# Patient Record
Sex: FEMALE | Race: WHITE | Employment: UNEMPLOYED | URBAN - NONMETROPOLITAN AREA
[De-identification: names, ages, dates, MRNs, and addresses within clinical notes are randomized per-mention and may not be internally consistent; named-entity substitution may affect disease eponyms.]

---

## 2017-01-01 ENCOUNTER — NURSE TRIAGE (OUTPATIENT)
Dept: ADMINISTRATIVE | Age: 57
End: 2017-01-01

## 2017-01-02 PROBLEM — R41.82 ALTERED MENTAL STATUS: Status: ACTIVE | Noted: 2017-01-02

## 2017-01-03 PROBLEM — I95.9 HYPOTENSION: Status: ACTIVE | Noted: 2017-01-03

## 2017-01-03 PROBLEM — R09.02 HYPOXIA: Status: ACTIVE | Noted: 2017-01-03

## 2017-01-05 PROBLEM — R09.02 HYPOXIA: Status: ACTIVE | Noted: 2017-01-05

## 2017-01-05 PROBLEM — J18.9 PNEUMONIA: Status: ACTIVE | Noted: 2017-01-05

## 2017-01-11 ENCOUNTER — NURSE TRIAGE (OUTPATIENT)
Dept: ADMINISTRATIVE | Age: 57
End: 2017-01-11

## 2017-01-12 PROBLEM — R58 RETROPERITONEAL BLEED: Status: ACTIVE | Noted: 2017-01-12

## 2017-02-21 ENCOUNTER — INITIAL CONSULT (OUTPATIENT)
Dept: PHYSICAL MEDICINE AND REHAB | Age: 57
End: 2017-02-21

## 2017-02-21 VITALS
HEART RATE: 77 BPM | SYSTOLIC BLOOD PRESSURE: 106 MMHG | HEIGHT: 56 IN | WEIGHT: 110 LBS | BODY MASS INDEX: 24.75 KG/M2 | DIASTOLIC BLOOD PRESSURE: 73 MMHG

## 2017-02-21 DIAGNOSIS — R26.0 ATAXIC GAIT: ICD-10-CM

## 2017-02-21 DIAGNOSIS — I69.30 HISTORY OF STROKE WITH RESIDUAL DEFICIT: Primary | ICD-10-CM

## 2017-02-21 PROCEDURE — 99205 OFFICE O/P NEW HI 60 MIN: CPT | Performed by: PSYCHIATRY & NEUROLOGY

## 2017-02-21 PROCEDURE — 3017F COLORECTAL CA SCREEN DOC REV: CPT | Performed by: PSYCHIATRY & NEUROLOGY

## 2017-02-21 PROCEDURE — G8598 ASA/ANTIPLAT THER USED: HCPCS | Performed by: PSYCHIATRY & NEUROLOGY

## 2017-02-21 PROCEDURE — G8484 FLU IMMUNIZE NO ADMIN: HCPCS | Performed by: PSYCHIATRY & NEUROLOGY

## 2017-02-21 PROCEDURE — G8427 DOCREV CUR MEDS BY ELIG CLIN: HCPCS | Performed by: PSYCHIATRY & NEUROLOGY

## 2017-02-21 PROCEDURE — 4004F PT TOBACCO SCREEN RCVD TLK: CPT | Performed by: PSYCHIATRY & NEUROLOGY

## 2017-02-21 PROCEDURE — 3014F SCREEN MAMMO DOC REV: CPT | Performed by: PSYCHIATRY & NEUROLOGY

## 2017-02-21 PROCEDURE — G8420 CALC BMI NORM PARAMETERS: HCPCS | Performed by: PSYCHIATRY & NEUROLOGY

## 2017-05-03 PROBLEM — S62.102A CLOSED FRACTURE OF LEFT WRIST: Status: ACTIVE | Noted: 2017-05-03

## 2017-05-03 PROBLEM — S82.141A CLOSED FRACTURE OF RIGHT TIBIAL PLATEAU: Status: ACTIVE | Noted: 2017-05-03

## 2019-02-28 ENCOUNTER — HOSPITAL ENCOUNTER (OUTPATIENT)
Dept: CT IMAGING | Age: 59
Discharge: HOME | End: 2019-02-28
Payer: MEDICARE

## 2019-02-28 ENCOUNTER — HOSPITAL ENCOUNTER (OUTPATIENT)
Dept: PET IMAGING | Age: 59
Discharge: HOME | End: 2019-02-28
Payer: MEDICARE

## 2019-02-28 DIAGNOSIS — Z00.6 ENCOUNTER FOR EXAMINATION FOR NORMAL COMPARISON AND CONTROL IN CLINICAL RESEARCH PROGRAM: ICD-10-CM

## 2019-02-28 DIAGNOSIS — R91.1 LUNG NODULE: ICD-10-CM

## 2019-02-28 PROCEDURE — 3430000000 HC RX DIAGNOSTIC RADIOPHARMACEUTICAL: Performed by: INTERNAL MEDICINE

## 2019-02-28 PROCEDURE — 3209999900 CT COMPARISON OF OUTSIDE FILMS

## 2019-02-28 PROCEDURE — A9552 F18 FDG: HCPCS | Performed by: INTERNAL MEDICINE

## 2019-02-28 PROCEDURE — 78815 PET IMAGE W/CT SKULL-THIGH: CPT

## 2019-02-28 RX ORDER — FLUDEOXYGLUCOSE F 18 200 MCI/ML
13.3 INJECTION, SOLUTION INTRAVENOUS
Status: COMPLETED | OUTPATIENT
Start: 2019-02-28 | End: 2019-02-28

## 2019-02-28 RX ADMIN — FLUDEOXYGLUCOSE F 18 13.3 MILLICURIE: 200 INJECTION, SOLUTION INTRAVENOUS at 09:24

## 2019-03-05 ENCOUNTER — HOSPITAL ENCOUNTER (OUTPATIENT)
Dept: GENERAL RADIOLOGY | Age: 59
Discharge: HOME | End: 2019-03-05
Payer: MEDICARE

## 2019-03-05 ENCOUNTER — HOSPITAL ENCOUNTER (OUTPATIENT)
Dept: CT IMAGING | Age: 59
Discharge: HOME | End: 2019-03-05
Payer: MEDICARE

## 2019-03-05 DIAGNOSIS — Z00.6 EXAMINATION FOR NORMAL COMPARISON FOR CLINICAL RESEARCH: ICD-10-CM

## 2019-03-05 PROCEDURE — 3209999900 CT COMPARISON OF OUTSIDE FILMS

## 2019-03-05 PROCEDURE — 3209999900 XR COMPARISON OF OUTSIDE FILMS

## 2019-03-11 ENCOUNTER — CLINICAL DOCUMENTATION (OUTPATIENT)
Dept: CASE MANAGEMENT | Age: 59
End: 2019-03-11

## 2019-03-11 ENCOUNTER — HOSPITAL ENCOUNTER (OUTPATIENT)
Dept: RADIATION ONCOLOGY | Age: 59
Discharge: HOME | End: 2019-03-11
Payer: MEDICARE

## 2019-03-11 PROCEDURE — 99202 OFFICE O/P NEW SF 15 MIN: CPT | Performed by: RADIOLOGY

## 2019-03-12 ENCOUNTER — CLINICAL DOCUMENTATION (OUTPATIENT)
Dept: CASE MANAGEMENT | Age: 59
End: 2019-03-12

## 2019-03-18 ENCOUNTER — CLINICAL DOCUMENTATION (OUTPATIENT)
Dept: CASE MANAGEMENT | Age: 59
End: 2019-03-18

## 2019-03-19 ENCOUNTER — CLINICAL DOCUMENTATION (OUTPATIENT)
Dept: CASE MANAGEMENT | Age: 59
End: 2019-03-19

## 2019-05-29 ENCOUNTER — TELEPHONE (OUTPATIENT)
Dept: SPIRITUAL SERVICES | Facility: CLINIC | Age: 59
End: 2019-05-29

## 2019-07-09 ENCOUNTER — TELEPHONE (OUTPATIENT)
Dept: SPIRITUAL SERVICES | Facility: CLINIC | Age: 59
End: 2019-07-09

## 2019-07-09 NOTE — TELEPHONE ENCOUNTER
Patient was a referral from her [facility] / assessment for patient needs.  followed-up with a 2nd telephone call to schedule an appointment for the completion of documents, if desired. No answer. Left message.  will follow-up to schedule an appointment.

## 2019-09-25 ENCOUNTER — TELEPHONE (OUTPATIENT)
Dept: SPIRITUAL SERVICES | Facility: CLINIC | Age: 59
End: 2019-09-25

## 2019-09-25 NOTE — TELEPHONE ENCOUNTER
Patient was a referral from her [facility] / assessment for patient needs.  followed-up with a 3rd telephone attempt to offer support for her diagnosis and treatment, if desired, as well as to schedule an appointment for the completion of Advance Directives documents. No answer. Left message. A letter was sent with a brochure and business card with contact information.

## 2019-11-09 ENCOUNTER — HOSPITAL ENCOUNTER (EMERGENCY)
Age: 59
Discharge: HOME | End: 2019-11-09
Payer: MEDICARE

## 2019-11-09 ENCOUNTER — APPOINTMENT (OUTPATIENT)
Dept: GENERAL RADIOLOGY | Age: 59
End: 2019-11-09
Payer: MEDICARE

## 2019-11-09 VITALS
RESPIRATION RATE: 18 BRPM | DIASTOLIC BLOOD PRESSURE: 67 MMHG | OXYGEN SATURATION: 96 % | WEIGHT: 117 LBS | SYSTOLIC BLOOD PRESSURE: 105 MMHG | TEMPERATURE: 98.5 F | HEART RATE: 69 BPM | BODY MASS INDEX: 26.23 KG/M2

## 2019-11-09 DIAGNOSIS — S42.291A OTHER CLOSED DISPLACED FRACTURE OF PROXIMAL END OF RIGHT HUMERUS, INITIAL ENCOUNTER: Primary | ICD-10-CM
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
## 2019-11-09 PROCEDURE — 99283 EMERGENCY DEPT VISIT LOW MDM: CPT

## 2019-11-09 PROCEDURE — 73030 X-RAY EXAM OF SHOULDER: CPT

## 2019-11-09 RX ORDER — KETOROLAC TROMETHAMINE 10 MG/1
10 TABLET, FILM COATED ORAL EVERY 6 HOURS PRN
Qty: 12 TABLET | Refills: 0 | Status: SHIPPED | OUTPATIENT
Start: 2019-11-09

## 2019-11-09 ASSESSMENT — ENCOUNTER SYMPTOMS
SHORTNESS OF BREATH: 0
ABDOMINAL PAIN: 0
BACK PAIN: 0
PHOTOPHOBIA: 0
NAUSEA: 0
RHINORRHEA: 0
DIARRHEA: 0
VOMITING: 0

## 2019-11-09 ASSESSMENT — PAIN DESCRIPTION - PAIN TYPE: TYPE: ACUTE PAIN

## 2019-11-09 ASSESSMENT — PAIN SCALES - GENERAL: PAINLEVEL_OUTOF10: 10

## 2019-11-09 ASSESSMENT — PAIN DESCRIPTION - LOCATION: LOCATION: SHOULDER

## 2019-11-09 ASSESSMENT — PAIN DESCRIPTION - ORIENTATION: ORIENTATION: LEFT

## 2020-11-03 PROBLEM — J18.9 PNEUMONIA OF BOTH LOWER LOBES DUE TO INFECTIOUS ORGANISM: Status: RESOLVED | Noted: 2020-11-03 | Resolved: 2020-11-03

## 2021-10-20 ENCOUNTER — HOSPITAL ENCOUNTER (EMERGENCY)
Age: 61
Discharge: HOME | End: 2021-10-20
Attending: EMERGENCY MEDICINE
Payer: MEDICARE

## 2021-10-20 ENCOUNTER — APPOINTMENT (OUTPATIENT)
Dept: CT IMAGING | Age: 61
End: 2021-10-20
Payer: MEDICARE

## 2021-10-20 VITALS
SYSTOLIC BLOOD PRESSURE: 118 MMHG | DIASTOLIC BLOOD PRESSURE: 81 MMHG | OXYGEN SATURATION: 98 % | BODY MASS INDEX: 26.54 KG/M2 | RESPIRATION RATE: 16 BRPM | WEIGHT: 118 LBS | HEIGHT: 56 IN | HEART RATE: 60 BPM | TEMPERATURE: 97.7 F

## 2021-10-20 DIAGNOSIS — T14.8XXA MULTIPLE SKIN TEARS: ICD-10-CM

## 2021-10-20 DIAGNOSIS — S00.83XA TRAUMATIC HEMATOMA OF FOREHEAD, INITIAL ENCOUNTER: Primary | ICD-10-CM
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
## 2021-10-20 LAB
ANION GAP SERPL CALCULATED.3IONS-SCNC: 10 MEQ/L (ref 8–16)
APTT: 29.6 SECONDS (ref 22–38)
BASOPHILS # BLD: 0.2 %
BASOPHILS ABSOLUTE: 0 THOU/MM3 (ref 0–0.1)
BUN BLDV-MCNC: 16 MG/DL (ref 7–22)
CALCIUM SERPL-MCNC: 9.6 MG/DL (ref 8.5–10.5)
CHLORIDE BLD-SCNC: 106 MEQ/L (ref 98–111)
CO2: 26 MEQ/L (ref 23–33)
CREAT SERPL-MCNC: 0.7 MG/DL (ref 0.4–1.2)
EKG ATRIAL RATE: 70 BPM
EKG P AXIS: 65 DEGREES
EKG P-R INTERVAL: 136 MS
EKG Q-T INTERVAL: 354 MS
EKG QRS DURATION: 70 MS
EKG QTC CALCULATION (BAZETT): 382 MS
EKG R AXIS: 51 DEGREES
EKG T AXIS: 59 DEGREES
EKG VENTRICULAR RATE: 70 BPM
EOSINOPHIL # BLD: 0.4 %
EOSINOPHILS ABSOLUTE: 0 THOU/MM3 (ref 0–0.4)
ERYTHROCYTE [DISTWIDTH] IN BLOOD BY AUTOMATED COUNT: 13.3 % (ref 11.5–14.5)
ERYTHROCYTE [DISTWIDTH] IN BLOOD BY AUTOMATED COUNT: 44.5 FL (ref 35–45)
GFR SERPL CREATININE-BSD FRML MDRD: 85 ML/MIN/1.73M2
GLUCOSE BLD-MCNC: 97 MG/DL (ref 70–108)
HCT VFR BLD CALC: 38.8 % (ref 37–47)
HEMOGLOBIN: 12.8 GM/DL (ref 12–16)
IMMATURE GRANS (ABS): 0.04 THOU/MM3 (ref 0–0.07)
IMMATURE GRANULOCYTES: 0.5 %
INR BLD: 0.99 (ref 0.85–1.13)
LYMPHOCYTES # BLD: 13.4 %
LYMPHOCYTES ABSOLUTE: 1.1 THOU/MM3 (ref 1–4.8)
MCH RBC QN AUTO: 30.6 PG (ref 26–33)
MCHC RBC AUTO-ENTMCNC: 33 GM/DL (ref 32.2–35.5)
MCV RBC AUTO: 92.8 FL (ref 81–99)
MONOCYTES # BLD: 8.1 %
MONOCYTES ABSOLUTE: 0.6 THOU/MM3 (ref 0.4–1.3)
NUCLEATED RED BLOOD CELLS: 0 /100 WBC
PLATELET # BLD: 142 THOU/MM3 (ref 130–400)
PMV BLD AUTO: 10 FL (ref 9.4–12.4)
POTASSIUM REFLEX MAGNESIUM: 3.7 MEQ/L (ref 3.5–5.2)
RBC # BLD: 4.18 MILL/MM3 (ref 4.2–5.4)
SEG NEUTROPHILS: 77.4 %
SEGMENTED NEUTROPHILS ABSOLUTE COUNT: 6.2 THOU/MM3 (ref 1.8–7.7)
SODIUM BLD-SCNC: 142 MEQ/L (ref 135–145)
WBC # BLD: 8 THOU/MM3 (ref 4.8–10.8)

## 2021-10-20 PROCEDURE — 6370000000 HC RX 637 (ALT 250 FOR IP): Performed by: STUDENT IN AN ORGANIZED HEALTH CARE EDUCATION/TRAINING PROGRAM

## 2021-10-20 PROCEDURE — 12004 RPR S/N/AX/GEN/TRK7.6-12.5CM: CPT

## 2021-10-20 PROCEDURE — 85025 COMPLETE CBC W/AUTO DIFF WBC: CPT

## 2021-10-20 PROCEDURE — 2500000003 HC RX 250 WO HCPCS: Performed by: STUDENT IN AN ORGANIZED HEALTH CARE EDUCATION/TRAINING PROGRAM

## 2021-10-20 PROCEDURE — 36415 COLL VENOUS BLD VENIPUNCTURE: CPT

## 2021-10-20 PROCEDURE — 99284 EMERGENCY DEPT VISIT MOD MDM: CPT

## 2021-10-20 PROCEDURE — 80048 BASIC METABOLIC PNL TOTAL CA: CPT

## 2021-10-20 PROCEDURE — 85610 PROTHROMBIN TIME: CPT

## 2021-10-20 PROCEDURE — 85730 THROMBOPLASTIN TIME PARTIAL: CPT

## 2021-10-20 PROCEDURE — 70450 CT HEAD/BRAIN W/O DYE: CPT

## 2021-10-20 PROCEDURE — 93005 ELECTROCARDIOGRAM TRACING: CPT | Performed by: STUDENT IN AN ORGANIZED HEALTH CARE EDUCATION/TRAINING PROGRAM

## 2021-10-20 PROCEDURE — 93010 ELECTROCARDIOGRAM REPORT: CPT | Performed by: NUCLEAR MEDICINE

## 2021-10-20 RX ORDER — ACETAMINOPHEN 500 MG
1000 TABLET ORAL ONCE
Status: COMPLETED | OUTPATIENT
Start: 2021-10-20 | End: 2021-10-20

## 2021-10-20 RX ORDER — LIDOCAINE HYDROCHLORIDE 10 MG/ML
20 INJECTION, SOLUTION EPIDURAL; INFILTRATION; INTRACAUDAL; PERINEURAL ONCE
Status: COMPLETED | OUTPATIENT
Start: 2021-10-20 | End: 2021-10-20

## 2021-10-20 RX ADMIN — LIDOCAINE HYDROCHLORIDE 20 ML: 10 INJECTION, SOLUTION EPIDURAL; INFILTRATION; INTRACAUDAL at 16:51

## 2021-10-20 RX ADMIN — ACETAMINOPHEN 1000 MG: 500 TABLET ORAL at 13:57

## 2021-10-20 ASSESSMENT — PAIN DESCRIPTION - LOCATION: LOCATION: FACE

## 2021-10-20 ASSESSMENT — ENCOUNTER SYMPTOMS
CHOKING: 0
BACK PAIN: 0
CONSTIPATION: 0
NAUSEA: 0
ABDOMINAL PAIN: 0
DIARRHEA: 0
COLOR CHANGE: 1
SHORTNESS OF BREATH: 0
VOMITING: 0

## 2021-10-20 ASSESSMENT — PAIN DESCRIPTION - ORIENTATION: ORIENTATION: LEFT

## 2021-10-20 ASSESSMENT — PAIN SCALES - GENERAL
PAINLEVEL_OUTOF10: 8
PAINLEVEL_OUTOF10: 8

## 2021-10-20 ASSESSMENT — PAIN DESCRIPTION - PAIN TYPE: TYPE: ACUTE PAIN

## 2021-10-20 NOTE — ED TRIAGE NOTES
Presents to ED with c/o fall getting out of her car and hitting her head. Hematoma to left forehead. Alert and oriented. Respirations easy and unlabored. Patient unsure why she fell.

## 2021-10-20 NOTE — ED NOTES
Patient resting in bed. Respirations easy and unlabored. No distress noted. Call light within reach.        Neil Carlisle RN  10/20/21 8627

## 2021-10-20 NOTE — ED PROVIDER NOTES
[facility] ENCOUNTER          Pt Name: Casi Soto  MRN: [id number]  [place] 1960  Date of evaluation: 10/20/2021  Treating Resident Physician: Philip Vila MD  Supervising Physician: Anibal Hodges, [facility]       Chief Complaint   Patient presents with    Fall    Head Injury     History obtained from chart review and the patient. HISTORY OF PRESENT ILLNESS    Naina Pryor is a 57 y.o. female who presents to the emergency department for evaluation of fall. At approximately 1130 today, Naina fell on concrete striking her head while getting out of a car. She hit her left forehead and is complaining of 8/10 sharp pain to the area. She also complains of scrapes on her hands and left elbow. Fall was unwitnessed. She did not have any dizziness, lightheadedness, changes in vision prior to the fall. Patient is still on oxygen 2 L/min at night following a Covid infection a few months ago. Medical history significant for:  Osteogenesis imperfecta  Stroke in 2016 with no residual deficits  Lung cancer completed radiation last year          The patient has no other acute complaints at this time. REVIEW OF SYSTEMS   Review of Systems   Constitutional: Negative for activity change, chills, fatigue and fever. Eyes: Negative for visual disturbance. Respiratory: Negative for choking and shortness of breath. Cardiovascular: Negative for chest pain, palpitations and leg swelling. Gastrointestinal: Negative for abdominal pain, constipation, diarrhea, nausea and vomiting. Genitourinary: Negative for decreased urine volume and dysuria. Musculoskeletal: Negative for back pain and neck pain. Skin: Positive for color change and wound. Neurological: Positive for headaches. Negative for dizziness, tremors, seizures, syncope, facial asymmetry, speech difficulty, weakness, light-headedness and numbness.    Psychiatric/Behavioral: 0    pravastatin (PRAVACHOL) 40 MG tablet, Take 1 tablet by mouth nightly, Disp: 30 tablet, Rfl: 0    metoprolol (LOPRESSOR) 25 MG tablet, Take 0.5 tablets by mouth 2 times daily, Disp: 60 tablet, Rfl: 0    fluticasone (FLONASE) 50 MCG/ACT nasal spray, 1 spray by Nasal route daily, Disp: 1 Bottle, Rfl: 0    Multiple Vitamin (MULTIVITAMIN) tablet, Take 1 tablet by mouth daily, Disp: 30 tablet, Rfl: 0    vitamin D (CHOLECALCIFEROL) 1000 UNIT TABS tablet, Take 1 tablet by mouth daily, Disp: 30 tablet, Rfl: 0      SOCIAL HISTORY     Social History     Social History Narrative    Not on file     Social History     Tobacco Use    Smoking status: Current Every Day Smoker     Packs/day: 1.00     Years: 15.00     Pack years: 15.00     Types: Cigarettes    Smokeless tobacco: Never Used   Substance Use Topics    Alcohol use: No     Alcohol/week: 0.0 standard drinks     Comment: 2 glasses rum/day    Drug use: No         ALLERGIES     Allergies   Allergen Reactions    Melatonin Other (See Comments)     High blood pressure, anxiety, insomnia, flushing, sweating per sister    Zanaflex [Tizanidine]      Sister states drops blood pressure and pt has been unresponsive when taking previously         FAMILY HISTORY     Family History   Problem Relation Age of Onset    Arthritis Mother     Mental Illness Mother     Vision Loss Mother     Cancer Father          PREVIOUS RECORDS   Previous records reviewed: Last discharge summary reviewed. PHYSICAL EXAM     ED Triage Vitals   BP Temp Temp src Pulse Resp SpO2 Height Weight   -- -- -- -- -- -- -- --     Initial vital signs and nursing assessment reviewed and normal. Body mass index is 26.46 kg/m². Pulsoximetry is normal per my interpretation. Additional Vital Signs:  Vitals:    10/20/21 1607   BP:    Pulse: 60   Resp:    Temp:    SpO2:        Physical Exam  Vitals and nursing note reviewed. Constitutional:       General: She is not in acute distress. Appearance: Normal appearance. She is not ill-appearing, toxic-appearing or diaphoretic. HENT:      Head:        Right Ear: Tympanic membrane, ear canal and external ear normal.      Left Ear: Tympanic membrane, ear canal and external ear normal.      Mouth/Throat:      Mouth: Mucous membranes are dry. Pharynx: Oropharynx is clear. No oropharyngeal exudate or posterior oropharyngeal erythema. Eyes:      General: No scleral icterus. Right eye: No discharge. Left eye: No discharge. Conjunctiva/sclera: Conjunctivae normal.      Pupils: Pupils are equal, round, and reactive to light. Cardiovascular:      Rate and Rhythm: Normal rate and regular rhythm. Pulses: Normal pulses. Heart sounds: Normal heart sounds. Pulmonary:      Effort: Pulmonary effort is normal.      Breath sounds: Normal breath sounds. Abdominal:      General: Abdomen is flat. Palpations: Abdomen is soft. Tenderness: There is no abdominal tenderness. Musculoskeletal:      Left elbow: Laceration present. Right hand: Laceration present. Left hand: Laceration present. Cervical back: Normal range of motion. No deformity, rigidity or tenderness. Thoracic back: No deformity, tenderness or bony tenderness. Lumbar back: No deformity, tenderness or bony tenderness. Skin:     General: Skin is warm. Capillary Refill: Capillary refill takes less than 2 seconds. Neurological:      Mental Status: She is alert and oriented to person, place, and time. Psychiatric:         Mood and Affect: Mood normal.         Behavior: Behavior normal.               MEDICAL DECISION MAKING   Initial Differential Diagnosis:   1. Fall  2. Hematoma to left forehead  3. Multiple skin tears  4.  Concern for intracranial bleed given size of hematoma, patient's age and aspirin usage    Plan:    ECG   CBC, BMP, coags   Head CT without contrast   Lac Repair   Acetaminophen for analgesia    Summary:  No acute intracranial finding, other work-up unremarkable. Skin tears to third and fourth left digits repaired with interrupted sutures using digital blocks for anesthesia. Third fourth and fifth left fingers splinted to prevent sutures from carrying out while healing. Additional skin tears dressed with clear occlusive dressing. Patient discharged home and advised to follow-up with her primary care provider in 7 to 10 days time for removal of sutures, and to keep splint in place for the next few days. Reviewed return precautions with patient and her daughter including changes in vision, nausea vomiting, increasing headache, redness erythema and warmth, purulent discharge to one of her multiple skin tears. ED RESULTS   Laboratory results:  Labs Reviewed   CBC WITH AUTO DIFFERENTIAL - Abnormal; Notable for the following components:       Result Value    RBC 4.18 (*)     All other components within normal limits   GLOMERULAR FILTRATION RATE, ESTIMATED - Abnormal; Notable for the following components:    Est, Glom Filt Rate 85 (*)     All other components within normal limits   BASIC METABOLIC PANEL W/ REFLEX TO MG FOR LOW K   PROTIME-INR   APTT   ANION GAP       Radiologic studies results:  CT Head WO Contrast   Final Result   1. No acute intracranial pathology   2. Large left frontal scalp hematoma   3. Chronic sinus disease            **This report has been created using voice recognition software. It may contain minor errors which are inherent in voice recognition technology. **      Final report electronically signed by Dr. Abbie Hernandez on 10/20/2021 2:05 PM          ED Medications administered this visit:   Medications   acetaminophen (TYLENOL) tablet 1,000 mg (1,000 mg Oral Given 10/20/21 2855)   lidocaine PF 1 % injection 20 mL (20 mLs IntraDERmal Given by Other 10/20/21 8175)         ED COURSE        Lac Repair    Date/Time: 10/20/2021 4:56 PM  Performed by: [facility], MD  Authorized by: Olimpia Melendrez DO     Consent:     Consent obtained:  Verbal    Consent given by:  Patient    Risks discussed:  Infection, pain, retained foreign body, poor cosmetic result, need for additional repair and poor wound healing    Alternatives discussed:  No treatment and delayed treatment  Anesthesia (see MAR for exact dosages): Anesthesia method:  Nerve block    Block needle gauge:  25 G    Block anesthetic:  Lidocaine 1% w/o epi    Block technique:  Digital block    Block injection procedure:  Anatomic landmarks identified, introduced needle and incremental injection    Block outcome:  Anesthesia achieved  Laceration details:     Location:  Finger    Finger location:  L long finger    Length (cm):  4    Depth (mm):  2  Repair type:     Repair type:  Simple  Pre-procedure details:     Preparation:  Patient was prepped and draped in usual sterile fashion  Exploration:     Wound exploration: wound explored through full range of motion      Wound extent: muscle damage    Treatment:     Area cleansed with:  Margarette    Amount of cleaning:  Standard    Irrigation solution:  Sterile saline    Irrigation volume:  30    Irrigation method:  Syringe    Visualized foreign bodies/material removed: no    Skin repair:     Repair method:  Sutures    Suture size:  6-0    Suture material:  Plain gut    Suture technique:  Simple interrupted    Number of sutures:  4  Approximation:     Approximation:  Close  Post-procedure details:     Dressing:  Non-adherent dressing, bulky dressing and splint for protection    Patient tolerance of procedure:   Tolerated well, no immediate complications  Lac Repair    Date/Time: 10/20/2021 5:01 PM  Performed by: Una Thomas MD  Authorized by: Olimpia Melendrez DO     Consent:     Consent obtained:  Verbal    Consent given by:  Patient    Risks discussed:  Infection, pain, retained foreign body, need for additional repair and poor cosmetic result    Alternatives discussed:  No treatment and delayed treatment  Anesthesia (see MAR for exact dosages): Anesthesia method:  Nerve block    Block location:  Left ring digit block    Block needle gauge:  25 G    Block anesthetic:  Lidocaine 1% w/o epi    Block technique:  Digit block    Block injection procedure:  Anatomic landmarks identified, introduced needle, incremental injection, anatomic landmarks palpated and negative aspiration for blood    Block outcome:  Anesthesia achieved  Laceration details:     Location:  Finger    Finger location:  L ring finger    Length (cm):  4    Depth (mm):  2  Repair type:     Repair type:  Simple  Pre-procedure details:     Preparation:  Patient was prepped and draped in usual sterile fashion  Exploration:     Wound exploration: wound explored through full range of motion      Wound extent: muscle damage    Treatment:     Area cleansed with:  Fabien-Breanna    Amount of cleaning:  Standard    Irrigation solution:  Sterile saline    Irrigation volume:  30    Irrigation method:  Syringe  Skin repair:     Repair method:  Sutures    Suture size:  6-0    Suture material:  Plain gut    Suture technique:  Simple interrupted    Number of sutures:  3  Approximation:     Approximation:  Close  Post-procedure details:     Dressing:  Non-adherent dressing, splint for protection and bulky dressing  Lac Repair    Date/Time: 10/20/2021 5:03 PM  Performed by: Jada De Paz MD  Authorized by: Cezar Urrutia DO         Strict return precautions and follow up instructions were discussed with the patient prior to discharge, with which the patient agrees. MEDICATION CHANGES     Discharge Medication List as of 10/20/2021  4:43 PM            FINAL DISPOSITION     Final diagnoses:   Traumatic hematoma of forehead, initial encounter   Multiple skin tears   Fall, initial encounter     Condition: condition: fair  Dispo: Discharge to home      This transcription was electronically signed.  Parts of this transcriptions may have been dictated by use of voice recognition software and electronically transcribed, and parts may have been transcribed with the assistance of an ED scribe. The transcription may contain errors not detected in proofreading. Please refer to my supervising physician's documentation if my documentation differs.     Electronically Signed: Sandra Ritchie MD, 10/20/21, 11:01 PM         Lisa Jiang MD  Resident  10/20/21 9613

## 2021-10-20 NOTE — ED NOTES
ED nurse-to-nurse bedside report    Chief Complaint   Patient presents with    Fall    Head Injury      LOC: alert and orientated to name, place, date  Vital signs   Vitals:    10/20/21 1315 10/20/21 1320   BP: 118/81    Pulse:  77   Resp:  16   Temp: 97.7 °F (36.5 °C)    TempSrc: Oral    SpO2:  98%   Weight:  118 lb (53.5 kg)   Height:  4' 8\" (1.422 m)      Pain:    Pain Interventions: tylenol  Pain Goal: 0  Oxygen: No    Current needs required none   Telemetry: No  LDAs:   Peripheral IV 10/20/21 Right Antecubital (Active)   Site Assessment Clean;Dry; Intact 10/20/21 1441   Dressing Status Clean;Dry; Intact 10/20/21 1441     Continuous Infusions:   Mobility: Requires assistance * 1  Shepard Fall Risk Score: No flowsheet data found.   Report given to: Oksana Velásquez RN  10/20/21 7644

## 2021-12-27 ENCOUNTER — APPOINTMENT (OUTPATIENT)
Dept: GENERAL RADIOLOGY | Age: 61
End: 2021-12-27
Payer: MEDICARE

## 2021-12-27 ENCOUNTER — HOSPITAL ENCOUNTER (EMERGENCY)
Age: 61
Discharge: HOME | End: 2021-12-27
Payer: MEDICARE

## 2021-12-27 VITALS
TEMPERATURE: 98.9 F | OXYGEN SATURATION: 94 % | DIASTOLIC BLOOD PRESSURE: 51 MMHG | RESPIRATION RATE: 18 BRPM | HEART RATE: 86 BPM | BODY MASS INDEX: 25.42 KG/M2 | SYSTOLIC BLOOD PRESSURE: 94 MMHG | WEIGHT: 113 LBS | HEIGHT: 56 IN

## 2021-12-27 DIAGNOSIS — J18.9 PNEUMONIA OF BOTH LUNGS DUE TO INFECTIOUS ORGANISM, UNSPECIFIED PART OF LUNG: ICD-10-CM

## 2021-12-27 DIAGNOSIS — U07.1 COVID-19: Primary | ICD-10-CM

## 2021-12-27 LAB
ALBUMIN SERPL-MCNC: 3.8 G/DL (ref 3.5–5.1)
ALP BLD-CCNC: 78 U/L (ref 38–126)
ALT SERPL-CCNC: 11 U/L (ref 11–66)
ANION GAP SERPL CALCULATED.3IONS-SCNC: 10 MEQ/L (ref 8–16)
AST SERPL-CCNC: 12 U/L (ref 5–40)
BASOPHILS # BLD: 0.2 %
BASOPHILS ABSOLUTE: 0 THOU/MM3 (ref 0–0.1)
BILIRUB SERPL-MCNC: 0.2 MG/DL (ref 0.3–1.2)
BUN BLDV-MCNC: 17 MG/DL (ref 7–22)
C-REACTIVE PROTEIN: 3.15 MG/DL (ref 0–1)
CALCIUM SERPL-MCNC: 9.3 MG/DL (ref 8.5–10.5)
CHLORIDE BLD-SCNC: 102 MEQ/L (ref 98–111)
CO2: 25 MEQ/L (ref 23–33)
CREAT SERPL-MCNC: 0.8 MG/DL (ref 0.4–1.2)
EKG ATRIAL RATE: 72 BPM
EKG P AXIS: 35 DEGREES
EKG P-R INTERVAL: 138 MS
EKG Q-T INTERVAL: 348 MS
EKG QRS DURATION: 74 MS
EKG QTC CALCULATION (BAZETT): 381 MS
EKG R AXIS: 48 DEGREES
EKG T AXIS: 57 DEGREES
EKG VENTRICULAR RATE: 72 BPM
EOSINOPHIL # BLD: 0.1 %
EOSINOPHILS ABSOLUTE: 0 THOU/MM3 (ref 0–0.4)
ERYTHROCYTE [DISTWIDTH] IN BLOOD BY AUTOMATED COUNT: 13.2 % (ref 11.5–14.5)
ERYTHROCYTE [DISTWIDTH] IN BLOOD BY AUTOMATED COUNT: 44.9 FL (ref 35–45)
FLU A ANTIGEN: NEGATIVE
FLU B ANTIGEN: NEGATIVE
GFR SERPL CREATININE-BSD FRML MDRD: 73 ML/MIN/1.73M2
GLUCOSE BLD-MCNC: 109 MG/DL (ref 70–108)
HCT VFR BLD CALC: 35.3 % (ref 37–47)
HEMOGLOBIN: 11.3 GM/DL (ref 12–16)
IMMATURE GRANS (ABS): 0.03 THOU/MM3 (ref 0–0.07)
IMMATURE GRANULOCYTES: 0.3 %
LYMPHOCYTES # BLD: 11.1 %
LYMPHOCYTES ABSOLUTE: 1.1 THOU/MM3 (ref 1–4.8)
MCH RBC QN AUTO: 30 PG (ref 26–33)
MCHC RBC AUTO-ENTMCNC: 32 GM/DL (ref 32.2–35.5)
MCV RBC AUTO: 93.6 FL (ref 81–99)
MONOCYTES # BLD: 7.7 %
MONOCYTES ABSOLUTE: 0.7 THOU/MM3 (ref 0.4–1.3)
NUCLEATED RED BLOOD CELLS: 0 /100 WBC
OSMOLALITY CALCULATION: 275.9 MOSMOL/KG (ref 275–300)
PLATELET # BLD: 113 THOU/MM3 (ref 130–400)
PMV BLD AUTO: 9.8 FL (ref 9.4–12.4)
POTASSIUM REFLEX MAGNESIUM: 4.1 MEQ/L (ref 3.5–5.2)
PRO-BNP: 836.3 PG/ML (ref 0–900)
PROCALCITONIN: 0.1 NG/ML (ref 0.01–0.09)
RBC # BLD: 3.77 MILL/MM3 (ref 4.2–5.4)
SARS-COV-2, NAAT: DETECTED
SEG NEUTROPHILS: 80.6 %
SEGMENTED NEUTROPHILS ABSOLUTE COUNT: 7.7 THOU/MM3 (ref 1.8–7.7)
SODIUM BLD-SCNC: 137 MEQ/L (ref 135–145)
TOTAL PROTEIN: 6.5 G/DL (ref 6.1–8)
TROPONIN T: < 0.01 NG/ML
WBC # BLD: 9.5 THOU/MM3 (ref 4.8–10.8)

## 2021-12-27 PROCEDURE — 86140 C-REACTIVE PROTEIN: CPT

## 2021-12-27 PROCEDURE — 80053 COMPREHEN METABOLIC PANEL: CPT

## 2021-12-27 PROCEDURE — 93005 ELECTROCARDIOGRAM TRACING: CPT | Performed by: PHYSICIAN ASSISTANT

## 2021-12-27 PROCEDURE — 85025 COMPLETE CBC W/AUTO DIFF WBC: CPT

## 2021-12-27 PROCEDURE — 84145 PROCALCITONIN (PCT): CPT

## 2021-12-27 PROCEDURE — 84484 ASSAY OF TROPONIN QUANT: CPT

## 2021-12-27 PROCEDURE — 83880 ASSAY OF NATRIURETIC PEPTIDE: CPT

## 2021-12-27 PROCEDURE — 36415 COLL VENOUS BLD VENIPUNCTURE: CPT

## 2021-12-27 PROCEDURE — 99283 EMERGENCY DEPT VISIT LOW MDM: CPT

## 2021-12-27 PROCEDURE — 87635 SARS-COV-2 COVID-19 AMP PRB: CPT

## 2021-12-27 PROCEDURE — 71045 X-RAY EXAM CHEST 1 VIEW: CPT

## 2021-12-27 PROCEDURE — 87804 INFLUENZA ASSAY W/OPTIC: CPT

## 2021-12-27 RX ORDER — DOXYCYCLINE HYCLATE 100 MG
100 TABLET ORAL 2 TIMES DAILY
Qty: 20 TABLET | Refills: 0 | Status: SHIPPED | OUTPATIENT
Start: 2021-12-27 | End: 2021-12-27 | Stop reason: ALTCHOICE

## 2021-12-27 RX ORDER — AZITHROMYCIN 250 MG/1
TABLET, FILM COATED ORAL
Qty: 6 TABLET | Refills: 0 | Status: SHIPPED | OUTPATIENT
Start: 2021-12-27

## 2021-12-27 RX ORDER — MULTIVIT-MIN/IRON/FOLIC ACID/K 18-600-40
500 CAPSULE ORAL 2 TIMES DAILY
Qty: 28 CAPSULE | Refills: 0 | Status: SHIPPED | OUTPATIENT
Start: 2021-12-27

## 2021-12-27 RX ORDER — GUAIFENESIN 100 MG/5ML
200 SOLUTION ORAL EVERY 4 HOURS PRN
Qty: 400 ML | Refills: 0 | Status: SHIPPED | OUTPATIENT
Start: 2021-12-27

## 2021-12-27 ASSESSMENT — ENCOUNTER SYMPTOMS
VOMITING: 0
ABDOMINAL PAIN: 0
COUGH: 1
SORE THROAT: 1
RHINORRHEA: 1
SHORTNESS OF BREATH: 1

## 2021-12-27 NOTE — ED NOTES
Pt ambulatory around nurse's station; 92-95% on RA. Pt tolerated well.       Joey Burrows RN  12/27/21 4238

## 2021-12-27 NOTE — ED PROVIDER NOTES
[facility]  eMERGENCY dEPARTMENT eNCOUnter          [address] [facility]       Chief Complaint   Patient presents with    Cough    Shortness of Breath       Nurses Notes reviewed and I agree except as noted inthe HPI. HISTORY OF PRESENT ILLNESS    Lynn Vale is a 57 y.o. female who presents to the Emergency Department for the evaluation of fever, cough, mild shortness of breath. Patient states her symptoms have been ongoing for the past 2 days with the mild shortness of breath occurring this morning, not currently. She notes symptoms including rhinorrhea, nasal congestion, sore throat, headache and productive cough though she has not been able to visualize the expectorated sputum. She reports fever of 102.3 °F this morning. Denies generalized body aches, chest pain, chest tightness, wheezing, vomiting, diarrhea or lower extremity pain/edema. She quit smoking 2 years ago when she was diagnosed with lung cancer. States she underwent radiation and is currently in remission. She has not tried anything for treatment of her current symptoms. She has not received Covid or influenza vaccines. The HPI was provided by the patient. REVIEW OF SYSTEMS     Review of Systems   Constitutional: Positive for chills and fever. HENT: Positive for congestion, rhinorrhea and sore throat. Respiratory: Positive for cough and shortness of breath. Cardiovascular: Negative for chest pain and leg swelling. Gastrointestinal: Negative for abdominal pain and vomiting. Musculoskeletal: Negative for myalgias. Neurological: Positive for headaches. All other systems reviewed and are negative.       PAST MEDICAL HISTORY    has a past medical history of Arthritis, Cerebral artery occlusion with cerebral infarction [facility]), Cerebral venous thrombosis of sigmoid sinus, Hypertension, Movement disorder, Osteogenesis imperfecta, Other disorders of kidney and ureter in diseases classified elsewhere, and Pneumonia. SURGICAL HISTORY      has a past surgical history that includes  section; joint replacement; Colonoscopy; and Wrist fracture surgery (Left, 2017). CURRENT MEDICATIONS       Discharge Medication List as of 2021  2:05 PM      CONTINUE these medications which have NOT CHANGED    Details   Calcium Carbonate (CALCIUM 500 PO) Take by mouthHistorical Med      HYDROcodone-acetaminophen (NORCO) 5-325 MG per tablet Take 1-2 tabs every 4-6 hours as needed for pain., Disp-120 tablet, R-0Normal      meloxicam (MOBIC) 15 MG tablet Take 1 tablet by mouth daily Hold until seen by surgeon as outpatient, Disp-1 tablet, R-0NO PRINT      traZODone (DESYREL) 50 MG tablet Take 1 tablet by mouth nightly as needed for Sleep, Disp-14 tablet, R-0Normal      aspirin 81 MG tablet Take 81 mg by mouth dailyHistorical Med      pantoprazole (PROTONIX) 20 MG tablet Take 20 mg by mouth dailyHistorical Med      pregabalin (LYRICA) 100 MG capsule Take 150 mg by mouth 3 times daily Historical Med      pravastatin (PRAVACHOL) 40 MG tablet Take 1 tablet by mouth nightly, Disp-30 tablet, R-0      metoprolol (LOPRESSOR) 25 MG tablet Take 0.5 tablets by mouth 2 times daily, Disp-60 tablet, R-0      Multiple Vitamin (MULTIVITAMIN) tablet Take 1 tablet by mouth daily, Disp-30 tablet, R-0      vitamin D (CHOLECALCIFEROL) 1000 UNIT TABS tablet Take 1 tablet by mouth daily, Disp-30 tablet, R-0      ketorolac (TORADOL) 10 MG tablet Take 1 tablet by mouth every 6 hours as needed for Pain, Disp-12 tablet, R-0Print      acetaminophen (TYLENOL) 325 MG tablet Take 2 tablets by mouth every 4 hours as needed for Pain Maximum dose of acetaminophen is 4000 mg from all sources in 24 hours. OTC      senna (SENOKOT) 8.6 MG tablet Take 2 tablets by mouth dailyHistorical Med      lactobacillus (CULTURELLE) capsule Take 1 capsule by mouth daily (with breakfast), Disp-30 capsule, R-0      fluticasone (FLONASE) 50 MCG/ACT nasal spray 1 spray by Nasal route daily, Disp-1 Bottle, R-0             ALLERGIES     is allergic to melatonin and zanaflex [tizanidine]. FAMILY HISTORY     She indicated that her mother is alive. She indicated that her father is . family history includes Arthritis in her mother; Cancer in her father; Mental Illness in her mother; Vision Loss in her mother. SOCIAL HISTORY      reports that she has quit smoking. Her smoking use included cigarettes. She has a 15.00 pack-year smoking history. She has never used smokeless tobacco. She reports that she does not drink alcohol and does not use drugs. PHYSICAL EXAM     INITIAL VITALS:  height is 4' 8\" (1.422 m) and weight is 113 lb (51.3 kg). Her oral temperature is 98.9 °F (37.2 °C). Her blood pressure is 94/51 (abnormal) and her pulse is 86. Her respiration is 18 and oxygen saturation is 94%. Physical Exam  Vitals and nursing note reviewed. Constitutional:       Appearance: She is well-developed. HENT:      Head: Normocephalic and atraumatic. Cardiovascular:      Rate and Rhythm: Normal rate and regular rhythm. Heart sounds: Normal heart sounds. No murmur heard. Pulmonary:      Effort: Pulmonary effort is normal. No accessory muscle usage or respiratory distress. Breath sounds: Normal breath sounds. No decreased breath sounds, wheezing or rhonchi. Abdominal:      Palpations: Abdomen is soft. Tenderness: There is no abdominal tenderness. Musculoskeletal:         General: Normal range of motion. Cervical back: Normal range of motion. Right lower leg: No tenderness. No edema. Left lower leg: No tenderness. No edema. Skin:     General: Skin is warm and dry. Neurological:      General: No focal deficit present. Mental Status: She is alert.    Psychiatric:         Mood and Affect: Mood normal.         DIFFERENTIAL DIAGNOSIS:   Differential diagnoses are discussed    DIAGNOSTIC RESULTS     EKG: All EKG's are interpreted by the Emergency Department Physician who either signs or Co-signsthis chart in the absence of a cardiologist.    Lisette Stanley. Rate: 72 bpm  PRinterval: 138 ms  QRS duration: 74 ms  QTc: 381 ms  P-R-T axes: 35, 48, 57  NSR. No STEMI. Compared to old EKG on 10-20-21      RADIOLOGY: non-plain film images(s) such as CT, Ultrasound and MRI are read by the radiologist.    XR CHEST PORTABLE   Final Result      Right upper and bilateral lower lung atelectasis/infiltrate. **This report has been created using voice recognition software. It may contain minor errors which are inherent in voice recognition technology. **      Final report electronically signed by Dr. Mao Morrison on 12/27/2021 1:34 PM          LABS:      Labs Reviewed   COVID-19, RAPID - Abnormal; Notable for the following components:       Result Value    SARS-CoV-2, NAAT DETECTED (*)     All other components within normal limits   CBC WITH AUTO DIFFERENTIAL - Abnormal; Notable for the following components:    RBC 3.77 (*)     Hemoglobin 11.3 (*)     Hematocrit 35.3 (*)     MCHC 32.0 (*)     Platelets 728 (*)     All other components within normal limits   COMPREHENSIVE METABOLIC PANEL W/ REFLEX TO MG FOR LOW K - Abnormal; Notable for the following components:    Glucose 109 (*)     Total Bilirubin 0.2 (*)     All other components within normal limits   GLOMERULAR FILTRATION RATE, ESTIMATED - Abnormal; Notable for the following components:    Est, Glom Filt Rate 73 (*)     All other components within normal limits   C-REACTIVE PROTEIN - Abnormal; Notable for the following components:    CRP 3.15 (*)     All other components within normal limits   PROCALCITONIN - Abnormal; Notable for the following components:    Procalcitonin 0.10 (*)     All other components within normal limits   RAPID INFLUENZA A/B ANTIGENS   TROPONIN   BRAIN NATRIURETIC PEPTIDE   ANION GAP   OSMOLALITY       EMERGENCY DEPARTMENT COURSE:   Vitals:    Vitals:    12/27/21 0949 12/27/21 1052 12/27/21 1207   BP: 110/60 (!) 91/56 (!) 94/51   Pulse: 86     Resp: 18     Temp: 98.9 °F (37.2 °C)     TempSrc: Oral     SpO2: 99% 92% 94%   Weight: 113 lb (51.3 kg)     Height: 4' 8\" (1.422 m)        12:09 PM EST: The patient was seen and evaluated. Patient presents with reassuring vital signs for complaint of fever, cough and mild shortness of breath that began 2 days ago. No current shortness of breath and no complaints of shortness of breath in the ED. She presents with oxygen saturation 99% on room air and maintains ambulatory oxygen at 92 to 95% on room air. Labs revealed Mildly elevated CRP at 3.15 with procalcitonin 0.1 and otherwise reassuring other than being positive for Covid today. She did not have a negative Covid test between her infection a couple months ago and today but did have returned to baseline and given her chest x-ray showing right upper and bilateral lower lung atelectasis/infiltrate, suspect she has recurrence of Covid/new strain. At this time she appears stable for discharge home. Given history of natural infection, it is not likely that monoclonal antibodies will provide any additional benefit. We will give vitamins for immune support as well as doxycycline and guaifenesin. She has home pulse oximeter available and feels comfortable with discharge home at this time. Return precautions were discussed with patient who denied further needs upon discharge. Family agreeable with above plan as well. CRITICAL CARE:   None    CONSULTS:  None    PROCEDURES:  None    FINAL IMPRESSION      1. COVID-19    2.  Pneumonia of both lungs due to infectious organism, unspecified part of lung          DISPOSITION/PLAN   Discharge    PATIENT REFERRED TO:  Florene [address] [place] [address] [phone]    Call   As needed    [address] [facility] [place] [address] [address] EMERGENCY DEPT  [address]  [address] [place] [address]  [phone]    If symptoms worsen      DISCHARGEMEDICATIONS:  Discharge Medication List as of 12/27/2021  2:05 PM      START taking these medications    Details   Ascorbic Acid (VITAMIN C) 500 MG CAPS Take 500 mg by mouth 2 times daily, Disp-28 capsule, R-0Normal      zinc 50 MG CAPS Take 100 mg by mouth nightly, Disp-28 capsule, R-0Normal      guaiFENesin (ROBITUSSIN) 100 MG/5ML SOLN oral solution Take 10 mLs by mouth every 4 hours as needed for Cough, Disp-400 mL, R-0Normal      doxycycline hyclate (VIBRA-TABS) 100 MG tablet Take 1 tablet by mouth 2 times daily for 10 days, Disp-20 tablet, R-0Normal             (Please note that portions of this note were completedwith a voice recognition program.  Efforts were made to edit the dictations but occasionally words are mis-transcribed.)        Camellia Lennox, PA-C  12/27/21 4298

## 2021-12-27 NOTE — ED TRIAGE NOTES
Pt to ED w/rprts of cough, SOB, runny nose. Denies CP. Alert and oriented x4. Breathing easy and unlabored on RA. Hx of lung cancer w/radition treatment two years ago. Annabel Bal, PA at bedside to assess.

## 2021-12-27 NOTE — ED NOTES
X-ray notified of portable chest x-ray still pending. Family updated. Pt resting on cot. No visible signs of distress noted.       Grace Jaimes RN  12/27/21 2066

## 2021-12-27 NOTE — ED NOTES
DC instructions, f/u care and prescriptions reviewed w/pt and daughter.       Mona Jones RN  12/27/21 4878

## 2021-12-28 ENCOUNTER — CARE COORDINATION (OUTPATIENT)
Dept: CARE COORDINATION | Age: 61
End: 2021-12-28

## 2021-12-28 NOTE — CARE COORDINATION
Attempted to reach patient for ED follow up in regards to COVID-19 education/ monitoring. Patient was unavailable at the time of my call, and a generic voicemail message was left asking patient to return my call at [phone].

## 2025-06-30 ENCOUNTER — APPOINTMENT (OUTPATIENT)
Dept: GENERAL RADIOLOGY | Age: 65
DRG: 480 | End: 2025-06-30
Payer: MEDICARE

## 2025-06-30 ENCOUNTER — APPOINTMENT (OUTPATIENT)
Dept: CT IMAGING | Age: 65
DRG: 480 | End: 2025-06-30
Payer: MEDICARE

## 2025-06-30 ENCOUNTER — HOSPITAL ENCOUNTER (INPATIENT)
Age: 65
LOS: 8 days | Discharge: NURSING, REHAB OR LONG-TERM CARE | DRG: 480 | End: 2025-07-08
Attending: EMERGENCY MEDICINE | Admitting: ORTHOPAEDIC SURGERY
Payer: MEDICARE

## 2025-06-30 DIAGNOSIS — Q78.0 OSTEOGENESIS IMPERFECTA: ICD-10-CM
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
## 2025-06-30 DIAGNOSIS — S72.8X2A OTHER FRACTURE OF LEFT FEMUR, INITIAL ENCOUNTER FOR CLOSED FRACTURE (HCC): Primary | ICD-10-CM

## 2025-06-30 PROBLEM — S72.352A CLOSED DISPLACED COMMINUTED FRACTURE OF SHAFT OF LEFT FEMUR, INITIAL ENCOUNTER (HCC): Status: ACTIVE | Noted: 2025-06-30

## 2025-06-30 LAB
ALBUMIN SERPL BCG-MCNC: 3.1 G/DL (ref 3.4–4.9)
ALP SERPL-CCNC: 119 U/L (ref 38–126)
ALT SERPL W/O P-5'-P-CCNC: 13 U/L (ref 10–35)
ANION GAP SERPL CALC-SCNC: 10 MEQ/L (ref 8–16)
AST SERPL-CCNC: 23 U/L (ref 10–35)
BASOPHILS ABSOLUTE: 0 THOU/MM3 (ref 0–0.1)
BASOPHILS NFR BLD AUTO: 0.3 %
BILIRUB CONJ SERPL-MCNC: < 0.1 MG/DL (ref 0–0.2)
BILIRUB SERPL-MCNC: 0.4 MG/DL (ref 0.3–1.2)
BUN SERPL-MCNC: 15 MG/DL (ref 8–23)
CALCIUM SERPL-MCNC: 8.5 MG/DL (ref 8.8–10.2)
CHLORIDE SERPL-SCNC: 109 MEQ/L (ref 98–111)
CO2 SERPL-SCNC: 18 MEQ/L (ref 22–29)
CREAT SERPL-MCNC: 0.6 MG/DL (ref 0.5–0.9)
DEPRECATED RDW RBC AUTO: 50.7 FL (ref 35–45)
EOSINOPHIL NFR BLD AUTO: 0.1 %
EOSINOPHILS ABSOLUTE: 0 THOU/MM3 (ref 0–0.4)
ERYTHROCYTE [DISTWIDTH] IN BLOOD BY AUTOMATED COUNT: 14.6 % (ref 11.5–14.5)
GFR SERPL CREATININE-BSD FRML MDRD: > 90 ML/MIN/1.73M2
GLUCOSE SERPL-MCNC: 118 MG/DL (ref 74–109)
HCT VFR BLD AUTO: 34.6 % (ref 37–47)
HGB BLD-MCNC: 10.4 GM/DL (ref 12–16)
IMM GRANULOCYTES # BLD AUTO: 0.02 THOU/MM3 (ref 0–0.07)
IMM GRANULOCYTES NFR BLD AUTO: 0.3 %
LYMPHOCYTES ABSOLUTE: 1.2 THOU/MM3 (ref 1–4.8)
LYMPHOCYTES NFR BLD AUTO: 16.2 %
MCH RBC QN AUTO: 29 PG (ref 26–33)
MCHC RBC AUTO-ENTMCNC: 30.1 GM/DL (ref 32.2–35.5)
MCV RBC AUTO: 96.4 FL (ref 81–99)
MONOCYTES ABSOLUTE: 0.5 THOU/MM3 (ref 0.4–1.3)
MONOCYTES NFR BLD AUTO: 7 %
NEUTROPHILS ABSOLUTE: 5.4 THOU/MM3 (ref 1.8–7.7)
NEUTROPHILS NFR BLD AUTO: 76.1 %
NRBC BLD AUTO-RTO: 0 /100 WBC
OSMOLALITY SERPL CALC.SUM OF ELEC: 275.7 MOSMOL/KG (ref 275–300)
PLATELET # BLD AUTO: 176 THOU/MM3 (ref 130–400)
PMV BLD AUTO: 10.5 FL (ref 9.4–12.4)
POTASSIUM SERPL-SCNC: 4.4 MEQ/L (ref 3.5–5.2)
PROT SERPL-MCNC: 5.6 G/DL (ref 6.4–8.3)
RBC # BLD AUTO: 3.59 MILL/MM3 (ref 4.2–5.4)
SODIUM SERPL-SCNC: 137 MEQ/L (ref 135–145)
TROPONIN, HIGH SENSITIVITY: 6 NG/L (ref 0–12)
WBC # BLD AUTO: 7.1 THOU/MM3 (ref 4.8–10.8)

## 2025-06-30 PROCEDURE — 96374 THER/PROPH/DIAG INJ IV PUSH: CPT

## 2025-06-30 PROCEDURE — 6360000002 HC RX W HCPCS: Performed by: PHYSICIAN ASSISTANT

## 2025-06-30 PROCEDURE — 80053 COMPREHEN METABOLIC PANEL: CPT

## 2025-06-30 PROCEDURE — 99222 1ST HOSP IP/OBS MODERATE 55: CPT

## 2025-06-30 PROCEDURE — 6370000000 HC RX 637 (ALT 250 FOR IP): Performed by: PHYSICIAN ASSISTANT

## 2025-06-30 PROCEDURE — 93005 ELECTROCARDIOGRAM TRACING: CPT

## 2025-06-30 PROCEDURE — 82248 BILIRUBIN DIRECT: CPT

## 2025-06-30 PROCEDURE — 84484 ASSAY OF TROPONIN QUANT: CPT

## 2025-06-30 PROCEDURE — 93005 ELECTROCARDIOGRAM TRACING: CPT | Performed by: PHYSICIAN ASSISTANT

## 2025-06-30 PROCEDURE — 85025 COMPLETE CBC W/AUTO DIFF WBC: CPT

## 2025-06-30 PROCEDURE — 6370000000 HC RX 637 (ALT 250 FOR IP)

## 2025-06-30 PROCEDURE — 36415 COLL VENOUS BLD VENIPUNCTURE: CPT

## 2025-06-30 PROCEDURE — 6360000002 HC RX W HCPCS

## 2025-06-30 PROCEDURE — 99285 EMERGENCY DEPT VISIT HI MDM: CPT

## 2025-06-30 PROCEDURE — 2500000003 HC RX 250 WO HCPCS: Performed by: PHYSICIAN ASSISTANT

## 2025-06-30 PROCEDURE — 1200000000 HC SEMI PRIVATE

## 2025-06-30 PROCEDURE — 6820000001 HC L2 TRAUMA SURGERY EVALUATION: Performed by: ORTHOPAEDIC SURGERY

## 2025-06-30 RX ORDER — MORPHINE SULFATE 2 MG/ML
1 INJECTION, SOLUTION INTRAMUSCULAR; INTRAVENOUS
Status: DISCONTINUED | OUTPATIENT
Start: 2025-06-30 | End: 2025-07-02

## 2025-06-30 RX ORDER — ATORVASTATIN CALCIUM 20 MG/1
20 TABLET, FILM COATED ORAL NIGHTLY
Status: DISCONTINUED | OUTPATIENT
Start: 2025-06-30 | End: 2025-07-08 | Stop reason: HOSPADM

## 2025-06-30 RX ORDER — SODIUM CHLORIDE 9 MG/ML
INJECTION, SOLUTION INTRAVENOUS PRN
Status: DISCONTINUED | OUTPATIENT
Start: 2025-06-30 | End: 2025-07-08 | Stop reason: HOSPADM

## 2025-06-30 RX ORDER — MORPHINE SULFATE 2 MG/ML
2 INJECTION, SOLUTION INTRAMUSCULAR; INTRAVENOUS ONCE
Refills: 0 | Status: COMPLETED | OUTPATIENT
Start: 2025-06-30 | End: 2025-06-30

## 2025-06-30 RX ORDER — POLYETHYLENE GLYCOL 3350 17 G/17G
17 POWDER, FOR SOLUTION ORAL DAILY PRN
Status: DISCONTINUED | OUTPATIENT
Start: 2025-06-30 | End: 2025-07-05

## 2025-06-30 RX ORDER — TRAZODONE HYDROCHLORIDE 50 MG/1
50 TABLET ORAL NIGHTLY PRN
Status: DISCONTINUED | OUTPATIENT
Start: 2025-06-30 | End: 2025-07-04

## 2025-06-30 RX ORDER — PANTOPRAZOLE SODIUM 40 MG/1
40 TABLET, DELAYED RELEASE ORAL
Status: DISCONTINUED | OUTPATIENT
Start: 2025-07-01 | End: 2025-07-08 | Stop reason: HOSPADM

## 2025-06-30 RX ORDER — MORPHINE SULFATE 2 MG/ML
2 INJECTION, SOLUTION INTRAMUSCULAR; INTRAVENOUS
Status: DISCONTINUED | OUTPATIENT
Start: 2025-06-30 | End: 2025-07-02

## 2025-06-30 RX ORDER — METOPROLOL TARTRATE 25 MG/1
12.5 TABLET, FILM COATED ORAL 2 TIMES DAILY
Status: DISCONTINUED | OUTPATIENT
Start: 2025-06-30 | End: 2025-07-08 | Stop reason: HOSPADM

## 2025-06-30 RX ORDER — ONDANSETRON 2 MG/ML
4 INJECTION INTRAMUSCULAR; INTRAVENOUS EVERY 6 HOURS PRN
Status: DISCONTINUED | OUTPATIENT
Start: 2025-06-30 | End: 2025-07-08 | Stop reason: HOSPADM

## 2025-06-30 RX ORDER — SODIUM CHLORIDE 0.9 % (FLUSH) 0.9 %
5-40 SYRINGE (ML) INJECTION PRN
Status: DISCONTINUED | OUTPATIENT
Start: 2025-06-30 | End: 2025-07-08 | Stop reason: HOSPADM

## 2025-06-30 RX ORDER — LACTOBACILLUS RHAMNOSUS GG 10B CELL
1 CAPSULE ORAL
Status: DISCONTINUED | OUTPATIENT
Start: 2025-07-01 | End: 2025-07-08 | Stop reason: HOSPADM

## 2025-06-30 RX ORDER — ACETAMINOPHEN 325 MG/1
650 TABLET ORAL EVERY 6 HOURS
Status: DISCONTINUED | OUTPATIENT
Start: 2025-06-30 | End: 2025-07-03

## 2025-06-30 RX ORDER — FLUTICASONE PROPIONATE 50 MCG
1 SPRAY, SUSPENSION (ML) NASAL DAILY PRN
Status: DISCONTINUED | OUTPATIENT
Start: 2025-06-30 | End: 2025-07-08 | Stop reason: HOSPADM

## 2025-06-30 RX ORDER — PREGABALIN 150 MG/1
150 CAPSULE ORAL 3 TIMES DAILY
Status: DISCONTINUED | OUTPATIENT
Start: 2025-06-30 | End: 2025-07-08 | Stop reason: HOSPADM

## 2025-06-30 RX ORDER — OXYCODONE HYDROCHLORIDE 5 MG/1
10 TABLET ORAL EVERY 4 HOURS PRN
Status: DISCONTINUED | OUTPATIENT
Start: 2025-06-30 | End: 2025-07-02

## 2025-06-30 RX ORDER — SENNOSIDES 8.6 MG/1
2 TABLET ORAL DAILY
Status: DISCONTINUED | OUTPATIENT
Start: 2025-07-01 | End: 2025-07-05

## 2025-06-30 RX ORDER — VITAMIN B COMPLEX
1000 TABLET ORAL DAILY
Status: DISCONTINUED | OUTPATIENT
Start: 2025-07-01 | End: 2025-07-08 | Stop reason: HOSPADM

## 2025-06-30 RX ORDER — MORPHINE SULFATE 2 MG/ML
2 INJECTION, SOLUTION INTRAMUSCULAR; INTRAVENOUS ONCE
Status: COMPLETED | OUTPATIENT
Start: 2025-06-30 | End: 2025-06-30

## 2025-06-30 RX ORDER — ASPIRIN 81 MG/1
81 TABLET ORAL DAILY
Status: DISCONTINUED | OUTPATIENT
Start: 2025-07-01 | End: 2025-07-01

## 2025-06-30 RX ORDER — ONDANSETRON 4 MG/1
4 TABLET, ORALLY DISINTEGRATING ORAL EVERY 8 HOURS PRN
Status: DISCONTINUED | OUTPATIENT
Start: 2025-06-30 | End: 2025-07-08 | Stop reason: HOSPADM

## 2025-06-30 RX ORDER — OXYCODONE HYDROCHLORIDE 5 MG/1
5 TABLET ORAL EVERY 4 HOURS PRN
Status: DISCONTINUED | OUTPATIENT
Start: 2025-06-30 | End: 2025-07-02

## 2025-06-30 RX ORDER — SODIUM CHLORIDE 9 MG/ML
INJECTION, SOLUTION INTRAVENOUS CONTINUOUS
Status: DISCONTINUED | OUTPATIENT
Start: 2025-07-01 | End: 2025-07-02

## 2025-06-30 RX ORDER — SODIUM CHLORIDE 0.9 % (FLUSH) 0.9 %
5-40 SYRINGE (ML) INJECTION EVERY 12 HOURS SCHEDULED
Status: DISCONTINUED | OUTPATIENT
Start: 2025-06-30 | End: 2025-07-08 | Stop reason: HOSPADM

## 2025-06-30 RX ADMIN — MORPHINE SULFATE 2 MG: 2 INJECTION, SOLUTION INTRAMUSCULAR; INTRAVENOUS at 21:45

## 2025-06-30 RX ADMIN — ACETAMINOPHEN 650 MG: 325 TABLET ORAL at 21:45

## 2025-06-30 RX ADMIN — SODIUM CHLORIDE, PRESERVATIVE FREE 10 ML: 5 INJECTION INTRAVENOUS at 21:38

## 2025-06-30 RX ADMIN — MORPHINE SULFATE 2 MG: 2 INJECTION, SOLUTION INTRAMUSCULAR; INTRAVENOUS at 18:17

## 2025-06-30 RX ADMIN — PREGABALIN 150 MG: 150 CAPSULE ORAL at 23:59

## 2025-06-30 ASSESSMENT — PAIN DESCRIPTION - LOCATION
LOCATION: LEG

## 2025-06-30 ASSESSMENT — PAIN DESCRIPTION - PAIN TYPE: TYPE: ACUTE PAIN

## 2025-06-30 ASSESSMENT — PAIN SCALES - GENERAL
PAINLEVEL_OUTOF10: 8
PAINLEVEL_OUTOF10: 9

## 2025-06-30 ASSESSMENT — PAIN DESCRIPTION - ORIENTATION
ORIENTATION: LEFT

## 2025-06-30 ASSESSMENT — PAIN - FUNCTIONAL ASSESSMENT
PAIN_FUNCTIONAL_ASSESSMENT: 0-10
PAIN_FUNCTIONAL_ASSESSMENT: 0-10
PAIN_FUNCTIONAL_ASSESSMENT: PREVENTS OR INTERFERES SOME ACTIVE ACTIVITIES AND ADLS

## 2025-06-30 ASSESSMENT — PAIN DESCRIPTION - ONSET: ONSET: ON-GOING

## 2025-06-30 ASSESSMENT — PAIN DESCRIPTION - FREQUENCY: FREQUENCY: CONTINUOUS

## 2025-06-30 ASSESSMENT — PAIN DESCRIPTION - DESCRIPTORS: DESCRIPTORS: ACHING;SHOOTING

## 2025-06-30 NOTE — ED NOTES
Patient transported to [facility]  by cart in stable condition.   IV  line is patent.

## 2025-06-30 NOTE — ED NOTES
ED to inpatient nurses report    Chief Complaint   Patient presents with    Fall      Present to ED from nursing home  LOC: alert and orientated to name, place, date  Vital signs   Vitals:    06/30/25 1647 06/30/25 1722 06/30/25 1819 06/30/25 1905   BP: 111/67 99/66 114/83 107/73   Pulse: (!) 104 (!) 108 (!) 108 (!) 111   Resp: 16 14 16 14   Temp: 98.3 °F (36.8 °C)      TempSrc: Oral      SpO2: 96% 96% 95% 96%   Weight: 54.4 kg (120 lb)      Height: 1.422 m (4' 8\")         Oxygen Baseline  96%    Current needs required ra Bipap/Cpap No  LDAs:   Peripheral IV 06/30/25 Left Antecubital (Active)   Site Assessment Clean, dry & intact 06/30/25 1914   Line Status Normal saline locked 06/30/25 1657   Phlebitis Assessment No symptoms 06/30/25 1657   Infiltration Assessment 0 06/30/25 1657   Alcohol Cap Used Yes 06/30/25 1657   Dressing Status Clean, dry & intact 06/30/25 1914   Dressing Type Transparent 06/30/25 1657     Mobility: Fully dependent  Pending ED orders:   none  Present condition: stable    C-SSRS Risk of Suicide: No Risk  Swallow Screening    Preferred Language: English     Electronically signed by MIKE NEWSOME RN on 6/30/2025 at 7:15 PM

## 2025-06-30 NOTE — ED PROVIDER NOTES
Attending Supervising Physician's Attestation Statement  I performed a history and physical examination on the patient and discussed the management with the resident physician at 1725. I reviewed and agree with the findings and plan as documented in the resident physician note. This includes all diagnostic interpretations and treatment plans as written. I was present for the key portion of any procedures performed and the inclusive time noted in any critical care statement. Except as noted below.     Brief H&P   This patient is a 64 y.o. Female with left femur fracture status post fall.  Patient has a history of osteogenesis imperfecta.  She fell earlier today off of her front porch and was found to have a femur fracture at [facility].  They spoke to Dr. Jimenez, who requested that she be transferred to the emergency room.  Patient denies striking her head, no loss of consciousness.    On my examination, nontoxic-appearing 64-year-old female, no distress    HEENT: Normocephalic, Atraumatic. Neck is supple without TTP.   Lungs: Clear and equal bilaterally. No increased work of breathing or respiratory distress.  Heart: Rate and rhythm regular, no murmurs clicks or gallops  Abdomen: Soft non-tender, and non-distended abdomen. No rigidity. No Guarding.   Lower extremities: No edema, left lower extremity is in a knee immobilizer, palpable pulses bilaterally  Neuro: Awake and alert, no lateralizing deficits, cranial nerves II through XII grossly intact bilaterally    Diagnostics and Treatments      LABS / RADIOLOGY:     Labs Reviewed   BASIC METABOLIC PANEL - Abnormal; Notable for the following components:       Result Value    CO2 18 (*)     Glucose 118 (*)     Calcium 8.5 (*)     All other components within normal limits   CBC WITH AUTO DIFFERENTIAL - Abnormal; Notable for the following components:    RBC 3.59 (*)     Hemoglobin 10.4 (*)     Hematocrit 34.6 (*)     MCHC 30.1 (*)     RDW-CV 14.6 (*)     RDW-SD 
Appearance: Normal appearance.   HENT:      Head: Normocephalic and atraumatic.      Right Ear: External ear normal.      Left Ear: External ear normal.      Nose: Nose normal.      Mouth/Throat:      Mouth: Mucous membranes are moist.   Eyes:      Conjunctiva/sclera: Conjunctivae normal.   Cardiovascular:      Rate and Rhythm: Normal rate and regular rhythm.      Heart sounds: No murmur heard.  Pulmonary:      Effort: Pulmonary effort is normal.      Breath sounds: Normal breath sounds.   Musculoskeletal:      Cervical back: Normal range of motion.      Right lower leg: No edema.      Left lower leg: No edema.      Comments: Left knee immobilizer in place  Patient localizing tenderness to the left femur   Skin:     General: Skin is warm and dry.   Neurological:      General: No focal deficit present.      Mental Status: She is alert and oriented to person, place, and time.         DIAGNOSTIC RESULTS     EKG:(none if blank)  [facility]'s [facility] who either signs or Co-signs this chart in the absence of a cardiologist.        RADIOLOGY: (none if blank)   Interpretation per the Radiologist below, if available at the time of this note:    XR INTERPRET OUTSIDE FILMS   Final Result   Impression:   Indeterminate pulmonary nodule right upper lobe.      This document has been electronically signed by: Alonso Jeff MD on    06/30/2025 06:07 PM      XR INTERPRET OUTSIDE FILMS   Final Result   Impression:   Status post left hip arthroplasty. Fracture involving the proximal left    femur adjacent to the femoral stem component.      This document has been electronically signed by: Alonso Jeff MD on    06/30/2025 06:09 PM      CT INTERPRETATION OF OUTSIDE IMAGES   Final Result   Impression:   Normal CT of the head.      This document has been electronically signed by: Alonso Jeff MD on    06/30/2025 06:03 PM      All CTs at this facility use dose modulation techniques and

## 2025-06-30 NOTE — ED TRIAGE NOTES
Pt presents to ED as transfer from [place] for a fall. Pt states she fell off of her front porch this morning. Pt was found to have left femur fracture. Pt has anaya cath in place on arrival. Rates pain 8/10.

## 2025-06-30 NOTE — H&P
Hospitalist Consult Note    Patient:  Naina Santos    Unit/Bed:18/018A  YOB: 1960  MRN: [id number]   Acct: [id number]   PCP: Angelica Melchor APRN - CNP  Code Status: Full Code    Date of Service: Pt seen/examined on 06/30/25 and admitted to Inpatient with expected LOS greater than two midnights due to medical therapy.     Chief Complaint: Fall    Assessment/Plan:    Pre-Operative Risk assessment using 2014 ACC/AHA guidelines     Emergent procedure No  Active Cardiac Condition No (decompensated HF, Arrhythmia, MI <3 weeks, severe valve disease)  Risk Level of Procedure Intermediate Risk (intraperitoneal, intrathoracic, HENT, orthopedic, or carotid endarterectomy, etc.)  Revised Cardiac Risk Index Risk factors: History of cerebrovascular disease. This correlates to a 6.0% 30-day risk of death, MI, or cardiac arrest.   Measurement of Exercise Tolerance before Surgery >4 No    According to the 2014 ACC/AHA pre-operative risk assessment guidelines Naina Santos is a medium risk for major cardiac complications during a intermediate risk procedure.  The risks and benefits were discussed at length with the patient who understands and accepts the risk, is agreeable to proceed with surgery as planned.      Left femur fracture s/p mechanical fall: Hx of left hip arthroplasty.  Tripped over walker and stairs on patio.  XR of left femur shows a fracture involving the fracture involving the proximal left femur adjacent to femoral stem component.   Currently in Flowers's traction.   Pain control and traction managed per primary.   Surgical intervention planned for AM.     HTN: Stable.   Continue home Metoprolol.    GERD:   Continue home PPI.      Hx of CVA: Reports no residual deficits   Hold ASA until ok with primary to resume.   Continue Statin.     HLD:  Continue home Statin.     Hx of osteogenesis imperfecta:  Continue Vitamin D supplements.         LDA: []CVC / []PICC / []Midline / [][place] / []Drains

## 2025-06-30 NOTE — H&P
History and Physical        CHIEF COMPLAINT:  fall; left periprosthetic femur fracture    Reason for Admission:  left periprosthetic femur shaft fracture    History Obtained From:  patient, electronic medical record    HISTORY OF PRESENT ILLNESS:      The patient is a 64 y.o. female with a past medical history significant for osteogenesis imperfecta, HTN, and stroke who was transferred from outside facility for left periprosthetic femoral shaft fracture.  Patient states earlier this morning she got up to water flowers and fell off her front porch.  She does not remember how she fell.  Denies hitting her head.  Denies loss of consciousness.  Denies any additional injuries.  Pain is localized to the left thigh.  Knee immobilizer in place.  Patient did have a right proximal tibia fracture in February, which was treated conservatively.  She states she was just recovering/bouncing back from this fracture.  Patient states that she lives at home with her daughter, daughter-in-law, and granddaughter.  She has additional family nearby.  Ambulates with walker at all times.  Denies chest pain, shortness of breath, nausea lightheadedness, dizziness, fever, chills, or numbness and tingling in her toes and feet.      Past Medical History:        Diagnosis Date    Arthritis     Cerebral artery occlusion with cerebral infarction (HCC)     Cerebral venous thrombosis of sigmoid sinus 2016    Sigmoid and transverse sinus    Hypertension     Movement disorder     Osteogenesis imperfecta     Other disorders of kidney and ureter in diseases classified elsewhere     Pneumonia      Past Surgical History:        Procedure Laterality Date     SECTION      COLONOSCOPY      JOINT REPLACEMENT      Hip    WRIST FRACTURE SURGERY Left 2017    Left Wrist ORIF, Right Tibial Plateau ORIF - Dr. Jimenez.         Medications Prior to Admission:   Not in a hospital admission.    Allergies:  Melatonin and Zanaflex [tizanidine]    Social

## 2025-07-01 ENCOUNTER — ANESTHESIA EVENT (OUTPATIENT)
Dept: OPERATING ROOM | Age: 65
End: 2025-07-01
Payer: MEDICARE

## 2025-07-01 ENCOUNTER — APPOINTMENT (OUTPATIENT)
Dept: GENERAL RADIOLOGY | Age: 65
DRG: 480 | End: 2025-07-01
Payer: MEDICARE

## 2025-07-01 ENCOUNTER — ANESTHESIA (OUTPATIENT)
Dept: OPERATING ROOM | Age: 65
End: 2025-07-01
Payer: MEDICARE

## 2025-07-01 LAB
25(OH)D3 SERPL-MCNC: 72 NG/ML (ref 30–100)
ABO GROUP BLD: NORMAL
ALBUMIN SERPL BCG-MCNC: 3.2 G/DL (ref 3.4–4.9)
ALP SERPL-CCNC: 110 U/L (ref 38–126)
ALT SERPL W/O P-5'-P-CCNC: 14 U/L (ref 10–35)
ANION GAP SERPL CALC-SCNC: 10 MEQ/L (ref 8–16)
APTT PPP: 25 SECONDS (ref 22–38)
AST SERPL-CCNC: 21 U/L (ref 10–35)
BASOPHILS ABSOLUTE: 0 THOU/MM3 (ref 0–0.1)
BASOPHILS NFR BLD AUTO: 0.3 %
BILIRUB CONJ SERPL-MCNC: < 0.1 MG/DL (ref 0–0.2)
BILIRUB SERPL-MCNC: 0.3 MG/DL (ref 0.3–1.2)
BUN SERPL-MCNC: 11 MG/DL (ref 8–23)
CALCIUM SERPL-MCNC: 8.3 MG/DL (ref 8.8–10.2)
CHLORIDE SERPL-SCNC: 108 MEQ/L (ref 98–111)
CO2 SERPL-SCNC: 22 MEQ/L (ref 22–29)
CREAT SERPL-MCNC: 0.5 MG/DL (ref 0.5–0.9)
DEPRECATED RDW RBC AUTO: 47.8 FL (ref 35–45)
EKG ATRIAL RATE: 102 BPM
EKG ATRIAL RATE: 112 BPM
EKG P AXIS: 52 DEGREES
EKG P AXIS: 63 DEGREES
EKG P-R INTERVAL: 134 MS
EKG P-R INTERVAL: 136 MS
EKG Q-T INTERVAL: 326 MS
EKG Q-T INTERVAL: 334 MS
EKG QRS DURATION: 70 MS
EKG QRS DURATION: 70 MS
EKG QTC CALCULATION (BAZETT): 435 MS
EKG QTC CALCULATION (BAZETT): 444 MS
EKG R AXIS: 55 DEGREES
EKG R AXIS: 57 DEGREES
EKG T AXIS: 63 DEGREES
EKG T AXIS: 66 DEGREES
EKG VENTRICULAR RATE: 102 BPM
EKG VENTRICULAR RATE: 112 BPM
EOSINOPHIL NFR BLD AUTO: 0.5 %
EOSINOPHILS ABSOLUTE: 0 THOU/MM3 (ref 0–0.4)
ERYTHROCYTE [DISTWIDTH] IN BLOOD BY AUTOMATED COUNT: 14.5 % (ref 11.5–14.5)
GFR SERPL CREATININE-BSD FRML MDRD: > 90 ML/MIN/1.73M2
GLUCOSE SERPL-MCNC: 112 MG/DL (ref 74–109)
HCT VFR BLD AUTO: 28.9 % (ref 37–47)
HGB BLD-MCNC: 9.2 GM/DL (ref 12–16)
IAT IGG-SP REAG SERPL QL: NORMAL
IMM GRANULOCYTES # BLD AUTO: 0.02 THOU/MM3 (ref 0–0.07)
IMM GRANULOCYTES NFR BLD AUTO: 0.3 %
INR PPP: 1.08 (ref 0.85–1.13)
LYMPHOCYTES ABSOLUTE: 1.4 THOU/MM3 (ref 1–4.8)
LYMPHOCYTES NFR BLD AUTO: 22.4 %
MCH RBC QN AUTO: 28.8 PG (ref 26–33)
MCHC RBC AUTO-ENTMCNC: 31.8 GM/DL (ref 32.2–35.5)
MCV RBC AUTO: 90.6 FL (ref 81–99)
MONOCYTES ABSOLUTE: 0.6 THOU/MM3 (ref 0.4–1.3)
MONOCYTES NFR BLD AUTO: 8.6 %
NEUTROPHILS ABSOLUTE: 4.3 THOU/MM3 (ref 1.8–7.7)
NEUTROPHILS NFR BLD AUTO: 67.9 %
NRBC BLD AUTO-RTO: 0 /100 WBC
OSMOLALITY SERPL CALC.SUM OF ELEC: 279.6 MOSMOL/KG (ref 275–300)
PLATELET # BLD AUTO: 150 THOU/MM3 (ref 130–400)
PMV BLD AUTO: 10.1 FL (ref 9.4–12.4)
POTASSIUM SERPL-SCNC: 3.9 MEQ/L (ref 3.5–5.2)
PROT SERPL-MCNC: 5.6 G/DL (ref 6.4–8.3)
PROTHROMBIN TIME: 12.3 SECONDS (ref 10–13.5)
PTH-INTACT SERPL-MCNC: 90 PG/ML (ref 15–65)
RBC # BLD AUTO: 3.19 MILL/MM3 (ref 4.2–5.4)
RH BLD: NORMAL
SODIUM SERPL-SCNC: 140 MEQ/L (ref 135–145)
WBC # BLD AUTO: 6.4 THOU/MM3 (ref 4.8–10.8)

## 2025-07-01 PROCEDURE — 6360000002 HC RX W HCPCS: Performed by: NURSE ANESTHETIST, CERTIFIED REGISTERED

## 2025-07-01 PROCEDURE — 2500000003 HC RX 250 WO HCPCS: Performed by: NURSE PRACTITIONER

## 2025-07-01 PROCEDURE — 82306 VITAMIN D 25 HYDROXY: CPT

## 2025-07-01 PROCEDURE — 36415 COLL VENOUS BLD VENIPUNCTURE: CPT

## 2025-07-01 PROCEDURE — 30233N1 TRANSFUSION OF NONAUTOLOGOUS RED BLOOD CELLS INTO PERIPHERAL VEIN, PERCUTANEOUS APPROACH: ICD-10-PCS | Performed by: ORTHOPAEDIC SURGERY

## 2025-07-01 PROCEDURE — 80053 COMPREHEN METABOLIC PANEL: CPT

## 2025-07-01 PROCEDURE — 3700000000 HC ANESTHESIA ATTENDED CARE: Performed by: ORTHOPAEDIC SURGERY

## 2025-07-01 PROCEDURE — 6360000002 HC RX W HCPCS: Performed by: PHYSICIAN ASSISTANT

## 2025-07-01 PROCEDURE — 85730 THROMBOPLASTIN TIME PARTIAL: CPT

## 2025-07-01 PROCEDURE — 85610 PROTHROMBIN TIME: CPT

## 2025-07-01 PROCEDURE — P9016 RBC LEUKOCYTES REDUCED: HCPCS

## 2025-07-01 PROCEDURE — 6370000000 HC RX 637 (ALT 250 FOR IP): Performed by: NURSE PRACTITIONER

## 2025-07-01 PROCEDURE — 0QH904Z INSERTION OF INTERNAL FIXATION DEVICE INTO LEFT FEMORAL SHAFT, OPEN APPROACH: ICD-10-PCS | Performed by: ORTHOPAEDIC SURGERY

## 2025-07-01 PROCEDURE — C1713 ANCHOR/SCREW BN/BN,TIS/BN: HCPCS | Performed by: ORTHOPAEDIC SURGERY

## 2025-07-01 PROCEDURE — 7100000001 HC PACU RECOVERY - ADDTL 15 MIN: Performed by: ORTHOPAEDIC SURGERY

## 2025-07-01 PROCEDURE — 6360000002 HC RX W HCPCS: Performed by: ORTHOPAEDIC SURGERY

## 2025-07-01 PROCEDURE — 3600000014 HC SURGERY LEVEL 4 ADDTL 15MIN: Performed by: ORTHOPAEDIC SURGERY

## 2025-07-01 PROCEDURE — 2500000003 HC RX 250 WO HCPCS: Performed by: PHYSICIAN ASSISTANT

## 2025-07-01 PROCEDURE — 99232 SBSQ HOSP IP/OBS MODERATE 35: CPT | Performed by: STUDENT IN AN ORGANIZED HEALTH CARE EDUCATION/TRAINING PROGRAM

## 2025-07-01 PROCEDURE — 6360000002 HC RX W HCPCS: Performed by: NURSE PRACTITIONER

## 2025-07-01 PROCEDURE — 2500000003 HC RX 250 WO HCPCS: Performed by: NURSE ANESTHETIST, CERTIFIED REGISTERED

## 2025-07-01 PROCEDURE — 6360000002 HC RX W HCPCS

## 2025-07-01 PROCEDURE — 86901 BLOOD TYPING SEROLOGIC RH(D): CPT

## 2025-07-01 PROCEDURE — 86885 COOMBS TEST INDIRECT QUAL: CPT

## 2025-07-01 PROCEDURE — 2709999900 HC NON-CHARGEABLE SUPPLY: Performed by: ORTHOPAEDIC SURGERY

## 2025-07-01 PROCEDURE — 2500000003 HC RX 250 WO HCPCS: Performed by: ORTHOPAEDIC SURGERY

## 2025-07-01 PROCEDURE — 3600000004 HC SURGERY LEVEL 4 BASE: Performed by: ORTHOPAEDIC SURGERY

## 2025-07-01 PROCEDURE — 1200000000 HC SEMI PRIVATE

## 2025-07-01 PROCEDURE — 85025 COMPLETE CBC W/AUTO DIFF WBC: CPT

## 2025-07-01 PROCEDURE — 73552 X-RAY EXAM OF FEMUR 2/>: CPT

## 2025-07-01 PROCEDURE — 83970 ASSAY OF PARATHORMONE: CPT

## 2025-07-01 PROCEDURE — 86900 BLOOD TYPING SEROLOGIC ABO: CPT

## 2025-07-01 PROCEDURE — 86923 COMPATIBILITY TEST ELECTRIC: CPT

## 2025-07-01 PROCEDURE — 2580000003 HC RX 258: Performed by: PHYSICIAN ASSISTANT

## 2025-07-01 PROCEDURE — 93010 ELECTROCARDIOGRAM REPORT: CPT | Performed by: INTERNAL MEDICINE

## 2025-07-01 PROCEDURE — 2720000010 HC SURG SUPPLY STERILE: Performed by: ORTHOPAEDIC SURGERY

## 2025-07-01 PROCEDURE — 7100000000 HC PACU RECOVERY - FIRST 15 MIN: Performed by: ORTHOPAEDIC SURGERY

## 2025-07-01 PROCEDURE — 3700000001 HC ADD 15 MINUTES (ANESTHESIA): Performed by: ORTHOPAEDIC SURGERY

## 2025-07-01 DEVICE — EVOS 3.5MM X 26MM LOCKING SCREW SELF-TAPPING
Type: IMPLANTABLE DEVICE | Status: FUNCTIONAL
Brand: EVOS

## 2025-07-01 DEVICE — EVOS 3.5MM X 28MM LOCKING SCREW SELF-TAPPING
Type: IMPLANTABLE DEVICE | Status: FUNCTIONAL
Brand: EVOS

## 2025-07-01 RX ORDER — FENTANYL CITRATE 50 UG/ML
INJECTION, SOLUTION INTRAMUSCULAR; INTRAVENOUS
Status: COMPLETED
Start: 2025-07-01 | End: 2025-07-01

## 2025-07-01 RX ORDER — SODIUM CHLORIDE 0.9 % (FLUSH) 0.9 %
5-40 SYRINGE (ML) INJECTION PRN
Status: DISCONTINUED | OUTPATIENT
Start: 2025-07-01 | End: 2025-07-01 | Stop reason: SDUPTHER

## 2025-07-01 RX ORDER — DEXAMETHASONE SODIUM PHOSPHATE 4 MG/ML
INJECTION, SOLUTION INTRA-ARTICULAR; INTRALESIONAL; INTRAMUSCULAR; INTRAVENOUS; SOFT TISSUE
Status: DISCONTINUED | OUTPATIENT
Start: 2025-07-01 | End: 2025-07-01 | Stop reason: SDUPTHER

## 2025-07-01 RX ORDER — ONDANSETRON 2 MG/ML
INJECTION INTRAMUSCULAR; INTRAVENOUS
Status: DISCONTINUED | OUTPATIENT
Start: 2025-07-01 | End: 2025-07-01 | Stop reason: SDUPTHER

## 2025-07-01 RX ORDER — ONDANSETRON 2 MG/ML
4 INJECTION INTRAMUSCULAR; INTRAVENOUS
Status: DISCONTINUED | OUTPATIENT
Start: 2025-07-01 | End: 2025-07-01 | Stop reason: HOSPADM

## 2025-07-01 RX ORDER — SODIUM CHLORIDE 0.9 % (FLUSH) 0.9 %
5-40 SYRINGE (ML) INJECTION EVERY 12 HOURS SCHEDULED
Status: DISCONTINUED | OUTPATIENT
Start: 2025-07-01 | End: 2025-07-01 | Stop reason: SDUPTHER

## 2025-07-01 RX ORDER — ATORVASTATIN CALCIUM 20 MG/1
20 TABLET, FILM COATED ORAL NIGHTLY
COMMUNITY

## 2025-07-01 RX ORDER — MEPERIDINE HYDROCHLORIDE 25 MG/ML
12.5 INJECTION INTRAMUSCULAR; INTRAVENOUS; SUBCUTANEOUS EVERY 5 MIN PRN
Status: DISCONTINUED | OUTPATIENT
Start: 2025-07-01 | End: 2025-07-01 | Stop reason: HOSPADM

## 2025-07-01 RX ORDER — MIDAZOLAM HYDROCHLORIDE 1 MG/ML
INJECTION, SOLUTION INTRAMUSCULAR; INTRAVENOUS
Status: DISCONTINUED | OUTPATIENT
Start: 2025-07-01 | End: 2025-07-01 | Stop reason: SDUPTHER

## 2025-07-01 RX ORDER — LIDOCAINE HYDROCHLORIDE 20 MG/ML
INJECTION, SOLUTION INTRAVENOUS
Status: DISCONTINUED | OUTPATIENT
Start: 2025-07-01 | End: 2025-07-01 | Stop reason: SDUPTHER

## 2025-07-01 RX ORDER — FENTANYL CITRATE 50 UG/ML
50 INJECTION, SOLUTION INTRAMUSCULAR; INTRAVENOUS EVERY 5 MIN PRN
Status: DISCONTINUED | OUTPATIENT
Start: 2025-07-01 | End: 2025-07-01 | Stop reason: HOSPADM

## 2025-07-01 RX ORDER — PROPOFOL 10 MG/ML
INJECTION, EMULSION INTRAVENOUS
Status: DISCONTINUED | OUTPATIENT
Start: 2025-07-01 | End: 2025-07-01 | Stop reason: SDUPTHER

## 2025-07-01 RX ORDER — SODIUM CHLORIDE 9 MG/ML
INJECTION, SOLUTION INTRAVENOUS PRN
Status: DISCONTINUED | OUTPATIENT
Start: 2025-07-01 | End: 2025-07-01 | Stop reason: HOSPADM

## 2025-07-01 RX ORDER — FENTANYL CITRATE 50 UG/ML
25 INJECTION, SOLUTION INTRAMUSCULAR; INTRAVENOUS EVERY 5 MIN PRN
Status: DISCONTINUED | OUTPATIENT
Start: 2025-07-01 | End: 2025-07-01 | Stop reason: HOSPADM

## 2025-07-01 RX ORDER — ASPIRIN 81 MG/1
81 TABLET ORAL DAILY
Status: DISCONTINUED | OUTPATIENT
Start: 2025-07-02 | End: 2025-07-08 | Stop reason: HOSPADM

## 2025-07-01 RX ORDER — TRANEXAMIC ACID 100 MG/ML
INJECTION, SOLUTION INTRAVENOUS
Status: DISCONTINUED | OUTPATIENT
Start: 2025-07-01 | End: 2025-07-01 | Stop reason: SDUPTHER

## 2025-07-01 RX ORDER — ROCURONIUM BROMIDE 10 MG/ML
INJECTION, SOLUTION INTRAVENOUS
Status: DISCONTINUED | OUTPATIENT
Start: 2025-07-01 | End: 2025-07-01 | Stop reason: SDUPTHER

## 2025-07-01 RX ORDER — FENTANYL CITRATE 50 UG/ML
INJECTION, SOLUTION INTRAMUSCULAR; INTRAVENOUS
Status: DISCONTINUED | OUTPATIENT
Start: 2025-07-01 | End: 2025-07-01 | Stop reason: SDUPTHER

## 2025-07-01 RX ADMIN — SODIUM CHLORIDE: 9 INJECTION, SOLUTION INTRAVENOUS at 13:07

## 2025-07-01 RX ADMIN — OXYCODONE 10 MG: 5 TABLET ORAL at 18:04

## 2025-07-01 RX ADMIN — ATORVASTATIN CALCIUM 20 MG: 20 TABLET, FILM COATED ORAL at 20:44

## 2025-07-01 RX ADMIN — SODIUM CHLORIDE: 9 INJECTION, SOLUTION INTRAVENOUS at 00:04

## 2025-07-01 RX ADMIN — PROPOFOL 50 MG: 10 INJECTION, EMULSION INTRAVENOUS at 13:01

## 2025-07-01 RX ADMIN — MORPHINE SULFATE 2 MG: 2 INJECTION, SOLUTION INTRAMUSCULAR; INTRAVENOUS at 00:10

## 2025-07-01 RX ADMIN — MORPHINE SULFATE 2 MG: 2 INJECTION, SOLUTION INTRAMUSCULAR; INTRAVENOUS at 04:25

## 2025-07-01 RX ADMIN — DEXAMETHASONE SODIUM PHOSPHATE 4 MG: 4 INJECTION, SOLUTION INTRAMUSCULAR; INTRAVENOUS at 13:06

## 2025-07-01 RX ADMIN — FENTANYL CITRATE 50 MCG: 50 INJECTION INTRAMUSCULAR; INTRAVENOUS at 14:43

## 2025-07-01 RX ADMIN — SODIUM CHLORIDE, PRESERVATIVE FREE 10 ML: 5 INJECTION INTRAVENOUS at 20:44

## 2025-07-01 RX ADMIN — WATER 2000 MG: 1 INJECTION INTRAMUSCULAR; INTRAVENOUS; SUBCUTANEOUS at 13:05

## 2025-07-01 RX ADMIN — MORPHINE SULFATE 2 MG: 2 INJECTION, SOLUTION INTRAMUSCULAR; INTRAVENOUS at 10:06

## 2025-07-01 RX ADMIN — FENTANYL CITRATE 50 MCG: 50 INJECTION, SOLUTION INTRAMUSCULAR; INTRAVENOUS at 14:43

## 2025-07-01 RX ADMIN — PREGABALIN 150 MG: 150 CAPSULE ORAL at 20:44

## 2025-07-01 RX ADMIN — ACETAMINOPHEN 650 MG: 325 TABLET ORAL at 20:44

## 2025-07-01 RX ADMIN — MIDAZOLAM 2 MG: 1 INJECTION INTRAMUSCULAR; INTRAVENOUS at 12:52

## 2025-07-01 RX ADMIN — FENTANYL CITRATE 25 MCG: 50 INJECTION, SOLUTION INTRAMUSCULAR; INTRAVENOUS at 13:21

## 2025-07-01 RX ADMIN — WATER 2000 MG: 1 INJECTION INTRAMUSCULAR; INTRAVENOUS; SUBCUTANEOUS at 20:44

## 2025-07-01 RX ADMIN — PROPOFOL 30 MG: 10 INJECTION, EMULSION INTRAVENOUS at 13:21

## 2025-07-01 RX ADMIN — RIVAROXABAN 10 MG: 10 TABLET, FILM COATED ORAL at 23:56

## 2025-07-01 RX ADMIN — FENTANYL CITRATE 25 MCG: 50 INJECTION, SOLUTION INTRAMUSCULAR; INTRAVENOUS at 13:18

## 2025-07-01 RX ADMIN — METOPROLOL TARTRATE 12.5 MG: 25 TABLET, FILM COATED ORAL at 20:44

## 2025-07-01 RX ADMIN — Medication 100 MG: at 13:01

## 2025-07-01 RX ADMIN — SUGAMMADEX 200 MG: 100 INJECTION, SOLUTION INTRAVENOUS at 14:14

## 2025-07-01 RX ADMIN — FENTANYL CITRATE 50 MCG: 50 INJECTION, SOLUTION INTRAMUSCULAR; INTRAVENOUS at 12:53

## 2025-07-01 RX ADMIN — TRANEXAMIC ACID 1000 MG: 100 INJECTION, SOLUTION INTRAVENOUS at 13:08

## 2025-07-01 RX ADMIN — ROCURONIUM BROMIDE 50 MG: 10 INJECTION, SOLUTION INTRAVENOUS at 13:01

## 2025-07-01 RX ADMIN — PHENYLEPHRINE HYDROCHLORIDE 200 MCG: 10 INJECTION INTRAVENOUS at 13:00

## 2025-07-01 RX ADMIN — ONDANSETRON 4 MG: 2 INJECTION, SOLUTION INTRAMUSCULAR; INTRAVENOUS at 13:06

## 2025-07-01 ASSESSMENT — PAIN SCALES - GENERAL
PAINLEVEL_OUTOF10: 3
PAINLEVEL_OUTOF10: 9
PAINLEVEL_OUTOF10: 6
PAINLEVEL_OUTOF10: 3
PAINLEVEL_OUTOF10: 5
PAINLEVEL_OUTOF10: 9
PAINLEVEL_OUTOF10: 8
PAINLEVEL_OUTOF10: 9
PAINLEVEL_OUTOF10: 8
PAINLEVEL_OUTOF10: 5
PAINLEVEL_OUTOF10: 9
PAINLEVEL_OUTOF10: 4
PAINLEVEL_OUTOF10: 4
PAINLEVEL_OUTOF10: 0

## 2025-07-01 ASSESSMENT — PAIN DESCRIPTION - DESCRIPTORS
DESCRIPTORS: SORE
DESCRIPTORS: ACHING
DESCRIPTORS: SHARP
DESCRIPTORS: SORE
DESCRIPTORS: SORE

## 2025-07-01 ASSESSMENT — PAIN - FUNCTIONAL ASSESSMENT
PAIN_FUNCTIONAL_ASSESSMENT: PREVENTS OR INTERFERES SOME ACTIVE ACTIVITIES AND ADLS
PAIN_FUNCTIONAL_ASSESSMENT: 0-10
PAIN_FUNCTIONAL_ASSESSMENT: PREVENTS OR INTERFERES SOME ACTIVE ACTIVITIES AND ADLS
PAIN_FUNCTIONAL_ASSESSMENT: PREVENTS OR INTERFERES WITH ALL ACTIVE AND SOME PASSIVE ACTIVITIES
PAIN_FUNCTIONAL_ASSESSMENT: PREVENTS OR INTERFERES SOME ACTIVE ACTIVITIES AND ADLS

## 2025-07-01 ASSESSMENT — PAIN DESCRIPTION - ORIENTATION
ORIENTATION: LEFT

## 2025-07-01 ASSESSMENT — PAIN DESCRIPTION - FREQUENCY: FREQUENCY: INTERMITTENT

## 2025-07-01 ASSESSMENT — PAIN DESCRIPTION - LOCATION
LOCATION: LEG

## 2025-07-01 ASSESSMENT — PAIN DESCRIPTION - PAIN TYPE
TYPE: ACUTE PAIN
TYPE: SURGICAL PAIN

## 2025-07-01 ASSESSMENT — LIFESTYLE VARIABLES: SMOKING_STATUS: 1

## 2025-07-01 NOTE — DISCHARGE INSTRUCTIONS
DR. JIMENEZ DISCHARGE INSTRUCTIONS  Dr. Valentin Jimenez MD       ACTIVITY / WEIGHTBEARING  INSTRUCTIONS:  Non Weightbearing surgical extremity.   PT/OT if ordered.    DIET:  Increase Fluid/Water intake, eat foods high in fiber, fruits and vegetables to help to prevent constipation.    WOUND/DRESSING INSTRUCTIONS:  Always ensure you wash your hands before and after caring for your wound/dressing.  Keep your dressing clean and dry. Change dressing as needed using dry gauze.  Can wash around incision.     If prineo (mesh tape dressing) in place, this may be removed after 3 weeks from surgery.   You may shower with prineo dressing if no active drainage coming from incision.   Do not let shower water directly hit the incision. Dry completely without rubbing.     Do not place antibiotic ointment, lotion, creams on surgical incision.  Smoking impairs adequate wound healing.  If smoking, consider quitting and decreasing.  If diabetic, keeping blood glucose levels in acceptable range will limit complications.   May apply ice to surgical incision to help to prevent swelling. Apply 20 minutes at a time, as needed.      MEDICATION INSTRUCTIONS:  Take pain medication as prescribed.  Decrease as tolerable.   See orders regarding resuming your home medications and any new medications.  Continue blood thinner if ordered by your physician.   Wear compression stocking as directed, this will help reduce swelling and blood clots. Wear on during the day, may remove at bedtime. Wear until follow-up unless otherwise directed.     FOLLOW-UP CARE:  If a follow-up appointment was not made for you at discharge, call [phone] ([place] office) or [phone] ([place] office) to schedule an appointment for 8 weeks.    Call Dr Jimenez's office with any concerns.     Signs of infection such as fever, chills, redness, pus, or bad smelling discharge from incision site.     Excessive bleeding, swelling, increasing pain, or discharge around incision

## 2025-07-01 NOTE — ANESTHESIA POSTPROCEDURE EVALUATION
Department of Anesthesiology  Postprocedure Note    Patient: Naina Santos  MRN: [id number]  YOB: 1960  Date of evaluation: 7/1/2025    Procedure Summary       Date: 07/01/25 Room / Location: [facility] OR [address] [place] [address] OR    Anesthesia Start: 1250 Anesthesia Stop: 1442    Procedure: LEFT FEMUR OPEN REDUCTION INTERNAL FIXATION (Left: Leg Upper) Diagnosis:       Closed displaced comminuted fracture of shaft of left femur, initial encounter ([facility])      (Closed displaced comminuted fracture of shaft of left femur, initial encounter ([facility]) [S72.352A])    Surgeons: Valentin Jimenez MD Responsible Provider:     Anesthesia Type: general ASA Status: 4            Anesthesia Type: No value filed.    Chapin Phase I: Chapin Score: 8    Chapin Phase II:      Anesthesia Post Evaluation    Patient location during evaluation: PACU  Patient participation: complete - patient participated  Level of consciousness: awake and alert  Pain score: 4  Airway patency: patent  Nausea & Vomiting: no nausea and no vomiting  Cardiovascular status: hemodynamically stable and blood pressure returned to baseline  Respiratory status: spontaneous ventilation, acceptable and nasal cannula  Hydration status: stable  Pain management: adequate and satisfactory to patient    No notable events documented.

## 2025-07-01 NOTE — PROCEDURES
PROCEDURE NOTE  Date: 6/30/2025   Name: Naina Santos  YOB: 1960    Procedures        EKG was completed and handed to RN

## 2025-07-01 NOTE — ANESTHESIA PRE PROCEDURE
650 mg at 06/30/25 2145   • oxyCODONE (ROXICODONE) immediate release tablet 5 mg  5 mg Oral Q4H PRN Soni Young PA        Or   • oxyCODONE (ROXICODONE) immediate release tablet 10 mg  10 mg Oral Q4H PRN Soni Young PA       • morphine (PF) injection 1 mg  1 mg IntraVENous Q2H PRN Soni Young PA        Or   • morphine (PF) injection 2 mg  2 mg IntraVENous Q2H PRN Soni Young PA   2 mg at 07/01/25 1006   • [Held by provider] aspirin EC tablet 81 mg  81 mg Oral Daily Mojgan Boston APRN - MELISSA       • fluticasone (FLONASE) 50 MCG/ACT nasal spray 1 spray  1 spray Nasal Daily PRN Mojgan Boston APRN - CNP       • lactobacillus (CULTURELLE) capsule 1 capsule  1 capsule Oral Daily with breakfast Mojgan Boston APRN - CNP       • metoprolol tartrate (LOPRESSOR) tablet 12.5 mg  12.5 mg Oral BID Mojgan Boston APRN - CNP       • pantoprazole (PROTONIX) tablet 40 mg  40 mg Oral QAM AC Mojgan Boston APRN - CNP       • atorvastatin (LIPITOR) tablet 20 mg  20 mg Oral Nightly Mojgan Boston APRN - CNP       • pregabalin (LYRICA) capsule 150 mg  150 mg Oral TID Mojgan Boston APRN - CNP   150 mg at 06/30/25 0729   • senna (SENOKOT) tablet 17.2 mg  2 tablet Oral Daily Mojgan Boston APRN - CNP       • traZODone (DESYREL) tablet 50 mg  50 mg Oral Nightly PRN Mojgan Boston APRN - CNP       • Vitamin D (CHOLECALCIFEROL) tablet 1,000 Units  1,000 Units Oral Daily Mojgan Boston APRN - CNP           Allergies:    Allergies   Allergen Reactions   • Melatonin Other (See Comments)     High blood pressure, anxiety, insomnia, flushing, sweating per sister   • Zanaflex [Tizanidine]      Sister states drops blood pressure and pt has been unresponsive when taking previously       Problem List:    Patient Active Problem List   Diagnosis Code   • Temporal lobe aphasia R47.01   • Stridor  inspiration expiration R06.1   • Osteogenesis imperfecta Q78.0   • Frequent falls R29.6   • Acute

## 2025-07-01 NOTE — CARE COORDINATION
Case Management Assessment Initial Evaluation    Date/Time of Evaluation: 7/1/2025 7:14 AM  Assessment Completed by: Debi Gary RN    If patient is discharged prior to next notation, then this note serves as note for discharge by case management.    Patient Name: Naina Santos                   YOB: 1960  Diagnosis: Closed displaced comminuted fracture of shaft of left femur, initial encounter ([facility]) [S72.352A]                   Date / Time: 6/30/2025  4:43 PM  [facility]     Patient Admission Status: Inpatient   Readmission Risk Low 0-14, Mod 15-19), High > 20: Readmission Risk Score: 15    Current PCP: Angelica Melchor APRN - CNP  Health Care Decision Makers:     Additional Case Management Notes: transfer in from [facility], pt fell off of her front porch.     PMH: HTN, GERD, Hx of CVA, HLD, osteogenesis imperfecta  Procedures: planned for  today at 1230 today left femur ORIF.    NPO, hold OAC, consent for surgery.  Imaging:   Pelvis xray:  Status post left hip arthroplasty. Fracture involving the proximal left   femur adjacent to the femoral stem component.   Tibia/fibula xray:    Fracture involving the proximal tibia. This has a chronic appearance.   Healed fracture involving the proximal left fibula.     Patient Goals/Plan/Treatment Preferences: Visited with Naina; she currently lives home with daughter, D-I-L, and her grand daughter. Pt has RW, has been to [facility] in past. She is aware she will need rehab post op.              07/01/25 0856   Service Assessment   Patient Orientation Alert and Oriented   Cognition Alert   History Provided By Patient   Primary Caregiver Self   Accompanied By/Relationship unaccomp   Support Systems Family Members;Children   Patient's Healthcare Decision Maker is: Patient Declined (Legal Next of Kin Remains as Decision Maker)   PCP Verified by CM Yes   Last Visit to PCP Within last 3 months   Prior Functional Level

## 2025-07-01 NOTE — FLOWSHEET NOTE
Pt admitted to  7K03 per Dr Jimenez from ED. Complains of left femur fx. INT in left AC. IV site free of s/s of infection or infiltration.  Instructed in use of call light, tv controls, bed controls with understanding verbalized.  Fall and safety protocols discussed with pt. Immobilizer in place on left leg. Pt A&Ox4.

## 2025-07-01 NOTE — CONSULTS
present previously.  There is mild pulmonary parenchymal scarring in the left lung base.  Heart: The heart is normal in size and configuration. Mediastinum: The mediastinum appears normal.  Pulmonary Vessels: The pulmonary vessels appear normal. The pulmonary vessels do not appear distended. Bone: There are no osseous abnormalities.     1. No acute cardiopulmonary disease. 2. A soft tissue density is present in the lateral aspect of the right lung apex which was not present previously.  This could either represent fibrosis from the treatment of the patient's right lung cancer or could represent a ondina density consistent with recurrent carcinoma. 3. Mild pulmonary parenchymal scarring in the left lung base. If you have questions or concerns regarding your Radiology (Medical Imaging) report, please reach out to your family provider or the ordering physician. Workstation ID:   [id number]     XR PELVIS (1-2 VIEWS)  Result Date: 6/30/2025  EXAMINATION: Exam: XR PELVIS 1 VIEW (STANDARD) Date of Exam: 6/30/2025 10:02 am HISTORY: no prev, fall, hx left hip replacement, left hip pain OI, LÁZARO COMPARISON: CT left lower extremity  02/11/2025 FINDINGS: The patient is status post total left hip replacement.  An acute fracture of the proximal left femur is present.  The symphysis pubis and bilateral sacroiliac joints appear normal.  A nondisplaced fracture of the left sacral ala is likely present.  Moderate degenerative a spurring is present in the right hip joint.     1. Status post total right hip replacement. 2. An acute fracture of the proximal left femoral shaft is present. 3. A fracture of the left sacral ala is likely present. 4. Moderate degenerative joint disease of the right hip. If you have questions or concerns regarding your Radiology (Medical Imaging) report, please reach out to your family provider or the ordering physician. Workstation ID:   [id number]        EKG: Sinus tachycardia without evidence of acute

## 2025-07-01 NOTE — CARE COORDINATION
DISCHARGE PLANNING EVALUATION  7/1/25, 12:06 PM EDT    Reason for Referral: discharge plan  Decision Maker: no POA, daughter is next of kin  Current Services: none   New Services Requested: considering snf   Family/ Social/ Home environment: Naina lives at home with family, has been managing her own care prior to admission  Payment Source:[employer] medicare, medicaid  Transportation at Discharge: undetermined   Post-acute (PAC) provider list was provided to patient. Patient was informed of their freedom to choose PAC provider. Discussed and offered to show the patient the relevant PAC Providers quality and resource use measures on Medicare Compare web site via computer based on patient's goals of care and treatment preferences. Questions regarding selection process were answered.      Teach Back Method used with patient  regarding care plan and discharge plan  Patient  verbalized understanding of the plan of care and contribute to goal setting.       Patient preferences and discharge plan: spoke with patient about discharge plan.  She is not ready to discuss plan until after surgery, but is considering snf. Discharge planning is ongoing.     Electronically signed by KRISTI Collins on 7/1/2025 at 12:06 PM

## 2025-07-01 NOTE — OP NOTE
Operative Note      Patient: Naina Santos  YOB: 1960  MRN: [id number]    Date of Procedure: 7/1/2025    Pre-Op Diagnosis Codes:      * Closed displaced comminuted fracture of shaft of left femur, initial encounter ([facility]) [S72.352A] periprosthetic    Post-Op Diagnosis: Same       Procedure: Open treatment left periprosthetic femoral shaft fracture with plate and screws    Surgeon(s):  Valentin Jimenez MD    Assistant:   Physician Assistant: Cayden Casey PA-C    The physician assistant was present for the entirety of the procedure and vital for the performance of the procedure.  He assisted with positioning, prepping, draping of the patient before the procedure and instrument manipulation, extremity repositioning  as well as wound closure, dressing application after the procedure. Please note that no intern, resident, or other hospital staff was available to assist during the surgery    Anesthesia: General    Estimated Blood Loss (mL): 200     Complications: None    Specimens:   * No specimens in log *    Implants:  Implant Name Type Inv. Item Serial No.  Lot No. LRB No. Used Action   EVOS CABLING SYSTEM     [id number] Left 1 Implanted   SCREW BONE L26MM DIA3.5MM S STL ST LCK FOR SM PLATING SYS - [id number]  SCREW BONE L26MM DIA3.5MM S STL ST LCK FOR SM PLATING SYS  [employer]-  Left 1 Implanted   SCREW BONE L28MM DIA3.5MM S STL ST LCK FOR EVOS SM PLATING - [id number]  SCREW BONE L28MM DIA3.5MM S STL ST LCK FOR EVOS SM PLATING  [employer]-  Left 3 Implanted   4.5 Cortical      Left 1 Implanted   4.5 [employer]  [id number]      Left 1 Implanted   4.5 [employer] [id number]      Left 1 Implanted   4.5 [employer] [id number]      Left 1 Implanted   4.5 [employer] [id number]      Left 1 Implanted   4.5 [employer] [id number]      Left 1 Implanted   4.5 [employer] [id number]      Left 1 Implanted   4.5 [employer] [id number]      Left 1 Implanted         Drains:   Urinary Catheter

## 2025-07-02 ENCOUNTER — APPOINTMENT (OUTPATIENT)
Dept: GENERAL RADIOLOGY | Age: 65
DRG: 480 | End: 2025-07-02
Payer: MEDICARE

## 2025-07-02 LAB
ANION GAP SERPL CALC-SCNC: 10 MEQ/L (ref 8–16)
BACTERIA: ABNORMAL
BILIRUB UR QL STRIP: NEGATIVE
BUN SERPL-MCNC: 11 MG/DL (ref 8–23)
CALCIUM SERPL-MCNC: 7.4 MG/DL (ref 8.8–10.2)
CASTS #/AREA URNS LPF: ABNORMAL /LPF
CASTS #/AREA URNS LPF: ABNORMAL /LPF
CHARACTER UR: ABNORMAL
CHARCOAL URNS QL MICRO: ABNORMAL
CHLORIDE SERPL-SCNC: 110 MEQ/L (ref 98–111)
CO2 SERPL-SCNC: 20 MEQ/L (ref 22–29)
COLOR UR: YELLOW
CREAT SERPL-MCNC: 0.7 MG/DL (ref 0.5–0.9)
CRYSTALS URNS QL MICRO: ABNORMAL
DEPRECATED RDW RBC AUTO: 49.6 FL (ref 35–45)
EPITHELIAL CELLS, UA: ABNORMAL /HPF
ERYTHROCYTE [DISTWIDTH] IN BLOOD BY AUTOMATED COUNT: 14.9 % (ref 11.5–14.5)
GFR SERPL CREATININE-BSD FRML MDRD: > 90 ML/MIN/1.73M2
GLUCOSE SERPL-MCNC: 109 MG/DL (ref 74–109)
GLUCOSE UR QL STRIP.AUTO: NEGATIVE MG/DL
HCT VFR BLD AUTO: 19.3 % (ref 37–47)
HCT VFR BLD AUTO: 24.1 % (ref 37–47)
HGB BLD-MCNC: 6.1 GM/DL (ref 12–16)
HGB BLD-MCNC: 7.8 GM/DL (ref 12–16)
HGB UR QL STRIP.AUTO: ABNORMAL
KETONES UR QL STRIP.AUTO: 15
LEUKOCYTE ESTERASE UR QL STRIP.AUTO: ABNORMAL
MCH RBC QN AUTO: 28.9 PG (ref 26–33)
MCHC RBC AUTO-ENTMCNC: 31.6 GM/DL (ref 32.2–35.5)
MCV RBC AUTO: 91.5 FL (ref 81–99)
NITRITE UR QL STRIP.AUTO: NEGATIVE
PATHOLOGIST REVIEW: ABNORMAL
PH UR STRIP.AUTO: 5.5 [PH] (ref 5–9)
PLATELET # BLD AUTO: 133 THOU/MM3 (ref 130–400)
PMV BLD AUTO: 10.4 FL (ref 9.4–12.4)
POTASSIUM SERPL-SCNC: 4.4 MEQ/L (ref 3.5–5.2)
PROT UR STRIP.AUTO-MCNC: ABNORMAL MG/DL
RBC # BLD AUTO: 2.11 MILL/MM3 (ref 4.2–5.4)
RBC #/AREA URNS HPF: > 200 /HPF
REASON FOR REJECTION: NORMAL
REJECTED TEST: NORMAL
RENAL EPI CELLS #/AREA URNS HPF: ABNORMAL /[HPF]
SCAN OF BLOOD SMEAR: NORMAL
SODIUM SERPL-SCNC: 140 MEQ/L (ref 135–145)
SP GR UR REFRACT.AUTO: 1.02 (ref 1–1.03)
UROBILINOGEN UR QL STRIP.AUTO: 0.2 EU/DL (ref 0–1)
WBC # BLD AUTO: 7.5 THOU/MM3 (ref 4.8–10.8)
WBC #/AREA URNS HPF: ABNORMAL /HPF
YEAST LIKE FUNGI URNS QL MICRO: ABNORMAL

## 2025-07-02 PROCEDURE — 85027 COMPLETE CBC AUTOMATED: CPT

## 2025-07-02 PROCEDURE — 73552 X-RAY EXAM OF FEMUR 2/>: CPT

## 2025-07-02 PROCEDURE — 2500000003 HC RX 250 WO HCPCS: Performed by: NURSE PRACTITIONER

## 2025-07-02 PROCEDURE — 85014 HEMATOCRIT: CPT

## 2025-07-02 PROCEDURE — 6370000000 HC RX 637 (ALT 250 FOR IP): Performed by: NURSE PRACTITIONER

## 2025-07-02 PROCEDURE — 36430 TRANSFUSION BLD/BLD COMPNT: CPT

## 2025-07-02 PROCEDURE — 6360000002 HC RX W HCPCS: Performed by: NURSE PRACTITIONER

## 2025-07-02 PROCEDURE — 1200000000 HC SEMI PRIVATE

## 2025-07-02 PROCEDURE — 99233 SBSQ HOSP IP/OBS HIGH 50: CPT | Performed by: STUDENT IN AN ORGANIZED HEALTH CARE EDUCATION/TRAINING PROGRAM

## 2025-07-02 PROCEDURE — 6370000000 HC RX 637 (ALT 250 FOR IP): Performed by: STUDENT IN AN ORGANIZED HEALTH CARE EDUCATION/TRAINING PROGRAM

## 2025-07-02 PROCEDURE — 6370000000 HC RX 637 (ALT 250 FOR IP): Performed by: PHYSICIAN ASSISTANT

## 2025-07-02 PROCEDURE — 36415 COLL VENOUS BLD VENIPUNCTURE: CPT

## 2025-07-02 PROCEDURE — 80048 BASIC METABOLIC PNL TOTAL CA: CPT

## 2025-07-02 PROCEDURE — 81001 URINALYSIS AUTO W/SCOPE: CPT

## 2025-07-02 PROCEDURE — 2580000003 HC RX 258: Performed by: STUDENT IN AN ORGANIZED HEALTH CARE EDUCATION/TRAINING PROGRAM

## 2025-07-02 PROCEDURE — 85018 HEMOGLOBIN: CPT

## 2025-07-02 RX ORDER — SODIUM CHLORIDE 9 MG/ML
INJECTION, SOLUTION INTRAVENOUS PRN
Status: DISCONTINUED | OUTPATIENT
Start: 2025-07-02 | End: 2025-07-08 | Stop reason: HOSPADM

## 2025-07-02 RX ORDER — TRAMADOL HYDROCHLORIDE 50 MG/1
50 TABLET ORAL EVERY 6 HOURS PRN
Status: DISCONTINUED | OUTPATIENT
Start: 2025-07-02 | End: 2025-07-03

## 2025-07-02 RX ORDER — TRAMADOL HYDROCHLORIDE 50 MG/1
100 TABLET ORAL EVERY 6 HOURS PRN
Status: DISCONTINUED | OUTPATIENT
Start: 2025-07-02 | End: 2025-07-03

## 2025-07-02 RX ORDER — SODIUM CHLORIDE 9 MG/ML
INJECTION, SOLUTION INTRAVENOUS CONTINUOUS
Status: ACTIVE | OUTPATIENT
Start: 2025-07-02 | End: 2025-07-02

## 2025-07-02 RX ADMIN — ACETAMINOPHEN 650 MG: 325 TABLET ORAL at 19:48

## 2025-07-02 RX ADMIN — METOPROLOL TARTRATE 12.5 MG: 25 TABLET, FILM COATED ORAL at 19:48

## 2025-07-02 RX ADMIN — ACETAMINOPHEN 650 MG: 325 TABLET ORAL at 02:12

## 2025-07-02 RX ADMIN — PREGABALIN 150 MG: 150 CAPSULE ORAL at 10:40

## 2025-07-02 RX ADMIN — PANTOPRAZOLE SODIUM 40 MG: 40 TABLET, DELAYED RELEASE ORAL at 05:35

## 2025-07-02 RX ADMIN — ACETAMINOPHEN 650 MG: 325 TABLET ORAL at 14:07

## 2025-07-02 RX ADMIN — SODIUM CHLORIDE: 0.9 INJECTION, SOLUTION INTRAVENOUS at 13:12

## 2025-07-02 RX ADMIN — ACETAMINOPHEN 650 MG: 325 TABLET ORAL at 10:40

## 2025-07-02 RX ADMIN — TRAMADOL HYDROCHLORIDE 100 MG: 50 TABLET, COATED ORAL at 13:30

## 2025-07-02 RX ADMIN — Medication 1000 UNITS: at 10:40

## 2025-07-02 RX ADMIN — ATORVASTATIN CALCIUM 20 MG: 20 TABLET, FILM COATED ORAL at 19:48

## 2025-07-02 RX ADMIN — TRAMADOL HYDROCHLORIDE 100 MG: 50 TABLET, COATED ORAL at 19:54

## 2025-07-02 RX ADMIN — PREGABALIN 150 MG: 150 CAPSULE ORAL at 14:07

## 2025-07-02 RX ADMIN — Medication 1 CAPSULE: at 10:40

## 2025-07-02 RX ADMIN — SENNOSIDES 17.2 MG: 8.6 TABLET, FILM COATED ORAL at 10:40

## 2025-07-02 RX ADMIN — WATER 2000 MG: 1 INJECTION INTRAMUSCULAR; INTRAVENOUS; SUBCUTANEOUS at 05:35

## 2025-07-02 RX ADMIN — PREGABALIN 150 MG: 150 CAPSULE ORAL at 19:48

## 2025-07-02 ASSESSMENT — PAIN DESCRIPTION - LOCATION
LOCATION: LEG

## 2025-07-02 ASSESSMENT — PAIN DESCRIPTION - ORIENTATION
ORIENTATION: LEFT

## 2025-07-02 ASSESSMENT — PAIN SCALES - GENERAL
PAINLEVEL_OUTOF10: 6
PAINLEVEL_OUTOF10: 3
PAINLEVEL_OUTOF10: 1
PAINLEVEL_OUTOF10: 8
PAINLEVEL_OUTOF10: 9

## 2025-07-02 ASSESSMENT — PAIN DESCRIPTION - DESCRIPTORS
DESCRIPTORS: SORE;ACHING
DESCRIPTORS: ACHING;DISCOMFORT
DESCRIPTORS: SHARP;DISCOMFORT

## 2025-07-02 ASSESSMENT — PAIN DESCRIPTION - FREQUENCY: FREQUENCY: CONTINUOUS

## 2025-07-02 ASSESSMENT — PAIN DESCRIPTION - ONSET: ONSET: ON-GOING

## 2025-07-02 ASSESSMENT — PAIN - FUNCTIONAL ASSESSMENT
PAIN_FUNCTIONAL_ASSESSMENT: PREVENTS OR INTERFERES SOME ACTIVE ACTIVITIES AND ADLS
PAIN_FUNCTIONAL_ASSESSMENT: PREVENTS OR INTERFERES SOME ACTIVE ACTIVITIES AND ADLS
PAIN_FUNCTIONAL_ASSESSMENT: ACTIVITIES ARE NOT PREVENTED

## 2025-07-02 ASSESSMENT — PAIN DESCRIPTION - PAIN TYPE: TYPE: SURGICAL PAIN

## 2025-07-02 NOTE — CARE COORDINATION
7/2/25, 12:57 PM EDT    DISCHARGE ON GOING EVALUATION    Naina Santos       Hospital day: 2  Location: -04/004-A Reason for admit: Closed displaced comminuted fracture of shaft of left femur, initial encounter ([facility]) [S72.352A]     Procedures:   7/01 Open treatment left periprosthetic femoral shaft fracture with plate and screws by Dr Jimenez    Imaging since last note: na    Barriers to Discharge: POD #1, Hgb 6.1 (9.2 preop). Transfuse blood as ordered. Hold therapy today with low Hgb.    PCP: Angelica Melchor APRN - CNP  Readmission Risk Score: 18.3    Patient Goals/Plan/Treatment Preferences: from home with family; requesting SNF in [facility]. SW consulted.

## 2025-07-02 NOTE — CARE COORDINATION
7/2/25, 1:47 PM EDT    DISCHARGE PLANNING EVALUATION    Anticipate snf, waiting PT/OT evals, will need precert for snf

## 2025-07-02 NOTE — FLOWSHEET NOTE
07/02/25 0623   Treatment Team Notification   Reason for Communication Evaluate   Name of Team Member Notified Wesley Randell   Treatment Team Role Physician Assistant   Method of Communication Call   Response See orders   Notification Time 0623     Called PA at this time stating patient having new confusion this am beginning approximatley at 0300. This RN stated that patient jumped out of bed at least two times due to confusion and put full weight on left leg. Patient ordered as non weight bearing status. PA ordered stat femur xray of left leg at this time.

## 2025-07-02 NOTE — CONSENT
Informed Consent for Blood Component Transfusion Note    I have discussed with the patient the rationale for blood component transfusion; its benefits in treating or preventing fatigue, organ damage, or death; and its risk which includes mild transfusion reactions, rare risk of blood borne infection, or more serious but rare reactions. I have discussed the alternatives to transfusion, including the risk and consequences of not receiving transfusion. The patient had an opportunity to ask questions and had agreed to proceed with transfusion of blood components.    Electronically signed by Jessica Sanchez PA-C on 7/2/25 at 8:59 AM EDT

## 2025-07-02 NOTE — FLOWSHEET NOTE
07/02/25 0443   Treatment Team Notification   Reason for Communication Change in status   Name of Team Member Notified Mojgan Ludy   Treatment Team Role Advanced Practice Nurse   Method of Communication Secure Message   Response See orders   Notification Time 0443     Notified NP this am at this time stating patient having new onset confusion. Patient has been alert and oriented X4 during this stay and at beginning of shift, but began jumping out of bed at approximately 0300. Patient pulling on anaya catheter and IV line, putting pressure on left leg though ordered non weight bearing, and patient stating, \"Is this room on fire? Is this line (her catheter) on fire?\" This RN was able to reorient patient both times that she got out of bed and set alarm off, and get her back to bed safely. Stated patient arrived to [facility] from [facility] with catheter, no urine collected. Asked if need for UA due to new confusion. NP ordered for catheter to be exchanged and urinalysis collected. Anaya exchanged at 0550, awaiting urine for sample.

## 2025-07-03 ENCOUNTER — APPOINTMENT (OUTPATIENT)
Dept: CT IMAGING | Age: 65
DRG: 480 | End: 2025-07-03
Payer: MEDICARE

## 2025-07-03 LAB
ANION GAP SERPL CALC-SCNC: 11 MEQ/L (ref 8–16)
BUN SERPL-MCNC: 6 MG/DL (ref 8–23)
CALCIUM SERPL-MCNC: 8.1 MG/DL (ref 8.8–10.2)
CHLORIDE SERPL-SCNC: 105 MEQ/L (ref 98–111)
CO2 SERPL-SCNC: 24 MEQ/L (ref 22–29)
CREAT SERPL-MCNC: 0.5 MG/DL (ref 0.5–0.9)
DEPRECATED RDW RBC AUTO: 50.3 FL (ref 35–45)
ERYTHROCYTE [DISTWIDTH] IN BLOOD BY AUTOMATED COUNT: 15.4 % (ref 11.5–14.5)
GFR SERPL CREATININE-BSD FRML MDRD: > 90 ML/MIN/1.73M2
GLUCOSE SERPL-MCNC: 109 MG/DL (ref 74–109)
HCT VFR BLD AUTO: 25.1 % (ref 37–47)
HGB BLD-MCNC: 8 GM/DL (ref 12–16)
MCH RBC QN AUTO: 29.1 PG (ref 26–33)
MCHC RBC AUTO-ENTMCNC: 31.9 GM/DL (ref 32.2–35.5)
MCV RBC AUTO: 91.3 FL (ref 81–99)
PLATELET # BLD AUTO: 159 THOU/MM3 (ref 130–400)
PMV BLD AUTO: 10.2 FL (ref 9.4–12.4)
POTASSIUM SERPL-SCNC: 4.5 MEQ/L (ref 3.5–5.2)
RBC # BLD AUTO: 2.75 MILL/MM3 (ref 4.2–5.4)
SODIUM SERPL-SCNC: 140 MEQ/L (ref 135–145)
WBC # BLD AUTO: 8.2 THOU/MM3 (ref 4.8–10.8)

## 2025-07-03 PROCEDURE — 6370000000 HC RX 637 (ALT 250 FOR IP): Performed by: PHYSICIAN ASSISTANT

## 2025-07-03 PROCEDURE — 6370000000 HC RX 637 (ALT 250 FOR IP): Performed by: NURSE PRACTITIONER

## 2025-07-03 PROCEDURE — 70450 CT HEAD/BRAIN W/O DYE: CPT

## 2025-07-03 PROCEDURE — 85027 COMPLETE CBC AUTOMATED: CPT

## 2025-07-03 PROCEDURE — 36415 COLL VENOUS BLD VENIPUNCTURE: CPT

## 2025-07-03 PROCEDURE — 97162 PT EVAL MOD COMPLEX 30 MIN: CPT

## 2025-07-03 PROCEDURE — 97110 THERAPEUTIC EXERCISES: CPT

## 2025-07-03 PROCEDURE — 80048 BASIC METABOLIC PNL TOTAL CA: CPT

## 2025-07-03 PROCEDURE — 2500000003 HC RX 250 WO HCPCS: Performed by: NURSE PRACTITIONER

## 2025-07-03 PROCEDURE — 1200000000 HC SEMI PRIVATE

## 2025-07-03 PROCEDURE — 6370000000 HC RX 637 (ALT 250 FOR IP): Performed by: STUDENT IN AN ORGANIZED HEALTH CARE EDUCATION/TRAINING PROGRAM

## 2025-07-03 PROCEDURE — 97535 SELF CARE MNGMENT TRAINING: CPT

## 2025-07-03 PROCEDURE — 99232 SBSQ HOSP IP/OBS MODERATE 35: CPT | Performed by: NURSE PRACTITIONER

## 2025-07-03 PROCEDURE — 97166 OT EVAL MOD COMPLEX 45 MIN: CPT

## 2025-07-03 RX ORDER — ACETAMINOPHEN 325 MG/1
650 TABLET ORAL EVERY 6 HOURS PRN
Status: DISCONTINUED | OUTPATIENT
Start: 2025-07-03 | End: 2025-07-07

## 2025-07-03 RX ORDER — CYCLOBENZAPRINE HCL 10 MG
10 TABLET ORAL 3 TIMES DAILY PRN
Status: DISCONTINUED | OUTPATIENT
Start: 2025-07-03 | End: 2025-07-03

## 2025-07-03 RX ORDER — HYDROCODONE BITARTRATE AND ACETAMINOPHEN 5; 325 MG/1; MG/1
1 TABLET ORAL EVERY 4 HOURS PRN
Status: DISCONTINUED | OUTPATIENT
Start: 2025-07-03 | End: 2025-07-08 | Stop reason: HOSPADM

## 2025-07-03 RX ORDER — HYDROCODONE BITARTRATE AND ACETAMINOPHEN 5; 325 MG/1; MG/1
2 TABLET ORAL EVERY 4 HOURS PRN
Status: DISCONTINUED | OUTPATIENT
Start: 2025-07-03 | End: 2025-07-08 | Stop reason: HOSPADM

## 2025-07-03 RX ADMIN — ATORVASTATIN CALCIUM 20 MG: 20 TABLET, FILM COATED ORAL at 21:02

## 2025-07-03 RX ADMIN — SENNOSIDES 17.2 MG: 8.6 TABLET, FILM COATED ORAL at 09:12

## 2025-07-03 RX ADMIN — Medication 1000 UNITS: at 09:12

## 2025-07-03 RX ADMIN — SODIUM CHLORIDE, PRESERVATIVE FREE 10 ML: 5 INJECTION INTRAVENOUS at 21:03

## 2025-07-03 RX ADMIN — RIVAROXABAN 10 MG: 10 TABLET, FILM COATED ORAL at 18:58

## 2025-07-03 RX ADMIN — PANTOPRAZOLE SODIUM 40 MG: 40 TABLET, DELAYED RELEASE ORAL at 05:04

## 2025-07-03 RX ADMIN — PREGABALIN 150 MG: 150 CAPSULE ORAL at 21:01

## 2025-07-03 RX ADMIN — TRAMADOL HYDROCHLORIDE 100 MG: 50 TABLET, COATED ORAL at 03:08

## 2025-07-03 RX ADMIN — METOPROLOL TARTRATE 12.5 MG: 25 TABLET, FILM COATED ORAL at 09:13

## 2025-07-03 RX ADMIN — METOPROLOL TARTRATE 12.5 MG: 25 TABLET, FILM COATED ORAL at 21:02

## 2025-07-03 RX ADMIN — HYDROCODONE BITARTRATE AND ACETAMINOPHEN 1 TABLET: 5; 325 TABLET ORAL at 21:01

## 2025-07-03 RX ADMIN — PREGABALIN 150 MG: 150 CAPSULE ORAL at 17:19

## 2025-07-03 RX ADMIN — PREGABALIN 150 MG: 150 CAPSULE ORAL at 09:12

## 2025-07-03 RX ADMIN — Medication 1 CAPSULE: at 09:12

## 2025-07-03 RX ADMIN — POLYETHYLENE GLYCOL 3350 17 G: 17 POWDER, FOR SOLUTION ORAL at 09:14

## 2025-07-03 RX ADMIN — SODIUM CHLORIDE, PRESERVATIVE FREE 10 ML: 5 INJECTION INTRAVENOUS at 09:14

## 2025-07-03 RX ADMIN — HYDROCODONE BITARTRATE AND ACETAMINOPHEN 1 TABLET: 5; 325 TABLET ORAL at 17:19

## 2025-07-03 RX ADMIN — ACETAMINOPHEN 650 MG: 325 TABLET ORAL at 09:11

## 2025-07-03 RX ADMIN — TRAZODONE HYDROCHLORIDE 50 MG: 50 TABLET ORAL at 21:01

## 2025-07-03 ASSESSMENT — PAIN - FUNCTIONAL ASSESSMENT: PAIN_FUNCTIONAL_ASSESSMENT: ACTIVITIES ARE NOT PREVENTED

## 2025-07-03 ASSESSMENT — PAIN SCALES - GENERAL
PAINLEVEL_OUTOF10: 4
PAINLEVEL_OUTOF10: 9
PAINLEVEL_OUTOF10: 9
PAINLEVEL_OUTOF10: 0

## 2025-07-03 ASSESSMENT — PAIN DESCRIPTION - LOCATION: LOCATION: LEG

## 2025-07-03 ASSESSMENT — PAIN DESCRIPTION - ORIENTATION: ORIENTATION: LEFT

## 2025-07-03 ASSESSMENT — PAIN DESCRIPTION - DESCRIPTORS: DESCRIPTORS: BURNING;ACHING

## 2025-07-03 NOTE — CARE COORDINATION
7/3/25, 2:14 PM EDT    DISCHARGE PLANNING EVALUATION    Spoke with daughter, Noemy.  Family would prefer patient return home, will consider [facility]. Referral made to [facility] for review.  Will need precert for placement.

## 2025-07-03 NOTE — DISCHARGE INSTR - COC
Nurse Assessment:  Last Vital Signs: /62   Pulse 97   Temp 98.1 °F (36.7 °C) (Oral)   Resp 16   Ht 1.422 m (4' 8\")   Wt 44.3 kg (97 lb 10.6 oz)   SpO2 96%   BMI 21.90 kg/m²     Last documented pain score (0-10 scale): Pain Level: 9  Last Weight:   Wt Readings from Last 1 Encounters:   07/08/25 44.3 kg (97 lb 10.6 oz)     Mental Status:  oriented and alert    IV Access:  - None    Nursing Mobility/ADLs:  Walking   Assisted  Transfer  Assisted  Bathing  Assisted  Dressing  Assisted  Toileting  Assisted  Feeding  Independent  Med Admin  Assisted  Med Delivery   whole    Wound Care Documentation and Therapy:  Incision 07/01/25 Thigh Left (Active)   Dressing Status Clean;Dry;Intact 07/07/25 2136   Dressing Change Due 07/06/25 07/06/25 0805   Incision Cleansed Betadine/povidone iodine 07/05/25 1613   Dressing/Treatment Dry dressing 07/07/25 2136   Closure Surgical glue 07/06/25 0805   Margins Other (Comment) 07/06/25 2000   Incision Assessment Other (Comment) 07/06/25 0805   Drainage Amount None (dry) 07/07/25 2136   Drainage Description Sanguinous 07/05/25 1613   Odor None 07/07/25 2136   Diana-incision Assessment Other (Comment) 07/06/25 0805   Number of days: 7        Elimination:  Continence:   Bowel: Yes  Bladder: Yes  Urinary Catheter: None   Colostomy/Ileostomy/Ileal Conduit: No       Date of Last BM: 7/6/2025    Intake/Output Summary (Last 24 hours) at 7/8/2025 0859  Last data filed at 7/8/2025 0823  Gross per 24 hour   Intake 250 ml   Output --   Net 250 ml     I/O last 3 completed shifts:  In: 600 [P.O.:600]  Out: 0     Safety Concerns:     At Risk for Falls    Impairments/Disabilities:      None    Nutrition Therapy:  Current Nutrition Therapy:   - Oral Diet:  General    Routes of Feeding: Oral  Liquids: No Restrictions  Daily Fluid Restriction: no  Last Modified Barium Swallow with Video (Video Swallowing Test): not done    Treatments at the Time of Hospital Discharge:

## 2025-07-04 LAB
ANION GAP SERPL CALC-SCNC: 9 MEQ/L (ref 8–16)
BUN SERPL-MCNC: 6 MG/DL (ref 8–23)
CALCIUM SERPL-MCNC: 8.5 MG/DL (ref 8.8–10.2)
CHLORIDE SERPL-SCNC: 109 MEQ/L (ref 98–111)
CO2 SERPL-SCNC: 26 MEQ/L (ref 22–29)
CREAT SERPL-MCNC: 0.5 MG/DL (ref 0.5–0.9)
DEPRECATED RDW RBC AUTO: 49 FL (ref 35–45)
ERYTHROCYTE [DISTWIDTH] IN BLOOD BY AUTOMATED COUNT: 15.4 % (ref 11.5–14.5)
GFR SERPL CREATININE-BSD FRML MDRD: > 90 ML/MIN/1.73M2
GLUCOSE SERPL-MCNC: 121 MG/DL (ref 74–109)
HCT VFR BLD AUTO: 24.2 % (ref 37–47)
HGB BLD-MCNC: 8 GM/DL (ref 12–16)
MCH RBC QN AUTO: 29.5 PG (ref 26–33)
MCHC RBC AUTO-ENTMCNC: 33.1 GM/DL (ref 32.2–35.5)
MCV RBC AUTO: 89.3 FL (ref 81–99)
PLATELET # BLD AUTO: 163 THOU/MM3 (ref 130–400)
PMV BLD AUTO: 9.9 FL (ref 9.4–12.4)
POTASSIUM SERPL-SCNC: 3.9 MEQ/L (ref 3.5–5.2)
RBC # BLD AUTO: 2.71 MILL/MM3 (ref 4.2–5.4)
SODIUM SERPL-SCNC: 144 MEQ/L (ref 135–145)
WBC # BLD AUTO: 8 THOU/MM3 (ref 4.8–10.8)

## 2025-07-04 PROCEDURE — 6370000000 HC RX 637 (ALT 250 FOR IP): Performed by: NURSE PRACTITIONER

## 2025-07-04 PROCEDURE — 85027 COMPLETE CBC AUTOMATED: CPT

## 2025-07-04 PROCEDURE — 6370000000 HC RX 637 (ALT 250 FOR IP): Performed by: PHYSICIAN ASSISTANT

## 2025-07-04 PROCEDURE — 80048 BASIC METABOLIC PNL TOTAL CA: CPT

## 2025-07-04 PROCEDURE — 36415 COLL VENOUS BLD VENIPUNCTURE: CPT

## 2025-07-04 PROCEDURE — 6370000000 HC RX 637 (ALT 250 FOR IP): Performed by: STUDENT IN AN ORGANIZED HEALTH CARE EDUCATION/TRAINING PROGRAM

## 2025-07-04 PROCEDURE — 2500000003 HC RX 250 WO HCPCS: Performed by: NURSE PRACTITIONER

## 2025-07-04 PROCEDURE — 1200000000 HC SEMI PRIVATE

## 2025-07-04 RX ORDER — TRAZODONE HYDROCHLORIDE 100 MG/1
100 TABLET ORAL NIGHTLY PRN
Status: DISCONTINUED | OUTPATIENT
Start: 2025-07-04 | End: 2025-07-08 | Stop reason: HOSPADM

## 2025-07-04 RX ADMIN — PREGABALIN 150 MG: 150 CAPSULE ORAL at 14:51

## 2025-07-04 RX ADMIN — Medication 1 CAPSULE: at 09:23

## 2025-07-04 RX ADMIN — METOPROLOL TARTRATE 12.5 MG: 25 TABLET, FILM COATED ORAL at 20:16

## 2025-07-04 RX ADMIN — HYDROCODONE BITARTRATE AND ACETAMINOPHEN 1 TABLET: 5; 325 TABLET ORAL at 05:36

## 2025-07-04 RX ADMIN — TRAZODONE HYDROCHLORIDE 100 MG: 100 TABLET ORAL at 20:16

## 2025-07-04 RX ADMIN — SENNOSIDES 17.2 MG: 8.6 TABLET, FILM COATED ORAL at 09:23

## 2025-07-04 RX ADMIN — SODIUM CHLORIDE, PRESERVATIVE FREE 10 ML: 5 INJECTION INTRAVENOUS at 20:16

## 2025-07-04 RX ADMIN — HYDROCODONE BITARTRATE AND ACETAMINOPHEN 1 TABLET: 5; 325 TABLET ORAL at 16:08

## 2025-07-04 RX ADMIN — POLYETHYLENE GLYCOL 3350 17 G: 17 POWDER, FOR SOLUTION ORAL at 09:28

## 2025-07-04 RX ADMIN — RIVAROXABAN 10 MG: 10 TABLET, FILM COATED ORAL at 18:40

## 2025-07-04 RX ADMIN — PREGABALIN 150 MG: 150 CAPSULE ORAL at 20:16

## 2025-07-04 RX ADMIN — HYDROCODONE BITARTRATE AND ACETAMINOPHEN 1 TABLET: 5; 325 TABLET ORAL at 09:36

## 2025-07-04 RX ADMIN — SODIUM CHLORIDE, PRESERVATIVE FREE 10 ML: 5 INJECTION INTRAVENOUS at 09:23

## 2025-07-04 RX ADMIN — PREGABALIN 150 MG: 150 CAPSULE ORAL at 09:23

## 2025-07-04 RX ADMIN — METOPROLOL TARTRATE 12.5 MG: 25 TABLET, FILM COATED ORAL at 09:23

## 2025-07-04 RX ADMIN — HYDROCODONE BITARTRATE AND ACETAMINOPHEN 1 TABLET: 5; 325 TABLET ORAL at 22:33

## 2025-07-04 RX ADMIN — ATORVASTATIN CALCIUM 20 MG: 20 TABLET, FILM COATED ORAL at 20:16

## 2025-07-04 RX ADMIN — PANTOPRAZOLE SODIUM 40 MG: 40 TABLET, DELAYED RELEASE ORAL at 05:36

## 2025-07-04 RX ADMIN — Medication 1000 UNITS: at 09:23

## 2025-07-04 ASSESSMENT — PAIN SCALES - GENERAL
PAINLEVEL_OUTOF10: 6
PAINLEVEL_OUTOF10: 2
PAINLEVEL_OUTOF10: 8
PAINLEVEL_OUTOF10: 9
PAINLEVEL_OUTOF10: 9
PAINLEVEL_OUTOF10: 8

## 2025-07-04 ASSESSMENT — PAIN DESCRIPTION - LOCATION
LOCATION: LEG
LOCATION: LEG

## 2025-07-04 ASSESSMENT — PAIN DESCRIPTION - ORIENTATION
ORIENTATION: LEFT
ORIENTATION: LEFT

## 2025-07-04 ASSESSMENT — PAIN - FUNCTIONAL ASSESSMENT
PAIN_FUNCTIONAL_ASSESSMENT: ACTIVITIES ARE NOT PREVENTED
PAIN_FUNCTIONAL_ASSESSMENT: ACTIVITIES ARE NOT PREVENTED

## 2025-07-04 ASSESSMENT — PAIN DESCRIPTION - DESCRIPTORS
DESCRIPTORS: ACHING
DESCRIPTORS: ACHING

## 2025-07-05 ENCOUNTER — APPOINTMENT (OUTPATIENT)
Dept: GENERAL RADIOLOGY | Age: 65
DRG: 480 | End: 2025-07-05
Payer: MEDICARE

## 2025-07-05 LAB — AMMONIA PLAS-MCNC: 27 UMOL/L (ref 11–51)

## 2025-07-05 PROCEDURE — 6370000000 HC RX 637 (ALT 250 FOR IP): Performed by: PHYSICIAN ASSISTANT

## 2025-07-05 PROCEDURE — 2580000003 HC RX 258: Performed by: NURSE PRACTITIONER

## 2025-07-05 PROCEDURE — 82140 ASSAY OF AMMONIA: CPT

## 2025-07-05 PROCEDURE — 36415 COLL VENOUS BLD VENIPUNCTURE: CPT

## 2025-07-05 PROCEDURE — 6370000000 HC RX 637 (ALT 250 FOR IP): Performed by: NURSE PRACTITIONER

## 2025-07-05 PROCEDURE — 2500000003 HC RX 250 WO HCPCS: Performed by: NURSE PRACTITIONER

## 2025-07-05 PROCEDURE — 1200000000 HC SEMI PRIVATE

## 2025-07-05 PROCEDURE — 71045 X-RAY EXAM CHEST 1 VIEW: CPT

## 2025-07-05 PROCEDURE — 6370000000 HC RX 637 (ALT 250 FOR IP): Performed by: STUDENT IN AN ORGANIZED HEALTH CARE EDUCATION/TRAINING PROGRAM

## 2025-07-05 PROCEDURE — 51701 INSERT BLADDER CATHETER: CPT

## 2025-07-05 PROCEDURE — 51798 US URINE CAPACITY MEASURE: CPT

## 2025-07-05 PROCEDURE — 99232 SBSQ HOSP IP/OBS MODERATE 35: CPT | Performed by: NURSE PRACTITIONER

## 2025-07-05 RX ORDER — SENNOSIDES 8.6 MG/1
4 TABLET ORAL DAILY
Status: DISCONTINUED | OUTPATIENT
Start: 2025-07-06 | End: 2025-07-08 | Stop reason: HOSPADM

## 2025-07-05 RX ORDER — DOCUSATE SODIUM 100 MG/1
100 CAPSULE, LIQUID FILLED ORAL 2 TIMES DAILY
Status: DISCONTINUED | OUTPATIENT
Start: 2025-07-05 | End: 2025-07-08 | Stop reason: HOSPADM

## 2025-07-05 RX ORDER — 0.9 % SODIUM CHLORIDE 0.9 %
500 INTRAVENOUS SOLUTION INTRAVENOUS ONCE
Status: COMPLETED | OUTPATIENT
Start: 2025-07-05 | End: 2025-07-05

## 2025-07-05 RX ORDER — POLYETHYLENE GLYCOL 3350 17 G/17G
17 POWDER, FOR SOLUTION ORAL 2 TIMES DAILY
Status: DISCONTINUED | OUTPATIENT
Start: 2025-07-05 | End: 2025-07-08 | Stop reason: HOSPADM

## 2025-07-05 RX ADMIN — SODIUM CHLORIDE, PRESERVATIVE FREE 10 ML: 5 INJECTION INTRAVENOUS at 11:46

## 2025-07-05 RX ADMIN — SODIUM CHLORIDE, PRESERVATIVE FREE 10 ML: 5 INJECTION INTRAVENOUS at 20:26

## 2025-07-05 RX ADMIN — POLYETHYLENE GLYCOL 3350 17 G: 17 POWDER, FOR SOLUTION ORAL at 15:44

## 2025-07-05 RX ADMIN — Medication 1 CAPSULE: at 11:46

## 2025-07-05 RX ADMIN — DOCUSATE SODIUM 100 MG: 100 CAPSULE, LIQUID FILLED ORAL at 15:44

## 2025-07-05 RX ADMIN — SENNOSIDES 17.2 MG: 8.6 TABLET, FILM COATED ORAL at 11:46

## 2025-07-05 RX ADMIN — RIVAROXABAN 10 MG: 10 TABLET, FILM COATED ORAL at 15:44

## 2025-07-05 RX ADMIN — ATORVASTATIN CALCIUM 20 MG: 20 TABLET, FILM COATED ORAL at 20:26

## 2025-07-05 RX ADMIN — Medication 1000 UNITS: at 11:46

## 2025-07-05 RX ADMIN — SODIUM CHLORIDE 500 ML: 0.9 INJECTION, SOLUTION INTRAVENOUS at 15:37

## 2025-07-05 RX ADMIN — DOCUSATE SODIUM 100 MG: 100 CAPSULE, LIQUID FILLED ORAL at 20:26

## 2025-07-05 RX ADMIN — PREGABALIN 150 MG: 150 CAPSULE ORAL at 11:46

## 2025-07-05 RX ADMIN — METOPROLOL TARTRATE 12.5 MG: 25 TABLET, FILM COATED ORAL at 20:26

## 2025-07-05 RX ADMIN — METOPROLOL TARTRATE 12.5 MG: 25 TABLET, FILM COATED ORAL at 11:45

## 2025-07-05 RX ADMIN — PREGABALIN 150 MG: 150 CAPSULE ORAL at 20:26

## 2025-07-05 RX ADMIN — POLYETHYLENE GLYCOL 3350 17 G: 17 POWDER, FOR SOLUTION ORAL at 20:26

## 2025-07-05 RX ADMIN — TRAZODONE HYDROCHLORIDE 100 MG: 100 TABLET ORAL at 20:26

## 2025-07-05 RX ADMIN — PREGABALIN 150 MG: 150 CAPSULE ORAL at 15:44

## 2025-07-05 RX ADMIN — PANTOPRAZOLE SODIUM 40 MG: 40 TABLET, DELAYED RELEASE ORAL at 06:28

## 2025-07-06 ENCOUNTER — APPOINTMENT (OUTPATIENT)
Dept: MRI IMAGING | Age: 65
DRG: 480 | End: 2025-07-06
Payer: MEDICARE

## 2025-07-06 LAB
ANION GAP SERPL CALC-SCNC: 14 MEQ/L (ref 8–16)
BACTERIA URNS QL MICRO: ABNORMAL /HPF
BILIRUB UR QL STRIP.AUTO: NEGATIVE
BUN SERPL-MCNC: 13 MG/DL (ref 8–23)
CALCIUM SERPL-MCNC: 9.2 MG/DL (ref 8.8–10.2)
CASTS #/AREA URNS LPF: ABNORMAL /LPF
CASTS 2: ABNORMAL /LPF
CHARACTER UR: CLEAR
CHLORIDE SERPL-SCNC: 105 MEQ/L (ref 98–111)
CO2 SERPL-SCNC: 22 MEQ/L (ref 22–29)
COLOR, UA: YELLOW
CREAT SERPL-MCNC: 0.5 MG/DL (ref 0.5–0.9)
CRYSTALS URNS MICRO: ABNORMAL
DEPRECATED RDW RBC AUTO: 52.1 FL (ref 35–45)
EPITHELIAL CELLS, UA: ABNORMAL /HPF
ERYTHROCYTE [DISTWIDTH] IN BLOOD BY AUTOMATED COUNT: 15 % (ref 11.5–14.5)
GFR SERPL CREATININE-BSD FRML MDRD: > 90 ML/MIN/1.73M2
GLUCOSE SERPL-MCNC: 121 MG/DL (ref 74–109)
GLUCOSE UR QL STRIP.AUTO: NEGATIVE MG/DL
HCT VFR BLD AUTO: 27.4 % (ref 37–47)
HGB BLD-MCNC: 8.5 GM/DL (ref 12–16)
HGB UR QL STRIP.AUTO: ABNORMAL
KETONES UR QL STRIP.AUTO: NEGATIVE
MCH RBC QN AUTO: 29.4 PG (ref 26–33)
MCHC RBC AUTO-ENTMCNC: 31 GM/DL (ref 32.2–35.5)
MCV RBC AUTO: 94.8 FL (ref 81–99)
MISCELLANEOUS 2: ABNORMAL
NITRITE UR QL STRIP: NEGATIVE
PH UR STRIP.AUTO: 7 [PH] (ref 5–9)
PLATELET # BLD AUTO: 235 THOU/MM3 (ref 130–400)
PMV BLD AUTO: 9.6 FL (ref 9.4–12.4)
POTASSIUM SERPL-SCNC: 3.9 MEQ/L (ref 3.5–5.2)
PROT UR STRIP.AUTO-MCNC: NEGATIVE MG/DL
RBC # BLD AUTO: 2.89 MILL/MM3 (ref 4.2–5.4)
RBC URINE: ABNORMAL /HPF
RENAL EPI CELLS #/AREA URNS HPF: ABNORMAL /[HPF]
SODIUM SERPL-SCNC: 141 MEQ/L (ref 135–145)
SP GR UR REFRACT.AUTO: 1.01 (ref 1–1.03)
UROBILINOGEN, URINE: 1 EU/DL (ref 0–1)
WBC # BLD AUTO: 6.5 THOU/MM3 (ref 4.8–10.8)
WBC #/AREA URNS HPF: ABNORMAL /HPF
WBC #/AREA URNS HPF: NEGATIVE /[HPF]
YEAST LIKE FUNGI URNS QL MICRO: ABNORMAL

## 2025-07-06 PROCEDURE — 81001 URINALYSIS AUTO W/SCOPE: CPT

## 2025-07-06 PROCEDURE — 80048 BASIC METABOLIC PNL TOTAL CA: CPT

## 2025-07-06 PROCEDURE — 36415 COLL VENOUS BLD VENIPUNCTURE: CPT

## 2025-07-06 PROCEDURE — 2500000003 HC RX 250 WO HCPCS: Performed by: NURSE PRACTITIONER

## 2025-07-06 PROCEDURE — 85027 COMPLETE CBC AUTOMATED: CPT

## 2025-07-06 PROCEDURE — 6370000000 HC RX 637 (ALT 250 FOR IP): Performed by: NURSE PRACTITIONER

## 2025-07-06 PROCEDURE — 97530 THERAPEUTIC ACTIVITIES: CPT

## 2025-07-06 PROCEDURE — 1200000000 HC SEMI PRIVATE

## 2025-07-06 PROCEDURE — 6370000000 HC RX 637 (ALT 250 FOR IP): Performed by: PHYSICIAN ASSISTANT

## 2025-07-06 PROCEDURE — 70551 MRI BRAIN STEM W/O DYE: CPT

## 2025-07-06 PROCEDURE — 97535 SELF CARE MNGMENT TRAINING: CPT

## 2025-07-06 PROCEDURE — 6370000000 HC RX 637 (ALT 250 FOR IP): Performed by: STUDENT IN AN ORGANIZED HEALTH CARE EDUCATION/TRAINING PROGRAM

## 2025-07-06 PROCEDURE — 97110 THERAPEUTIC EXERCISES: CPT

## 2025-07-06 RX ADMIN — PREGABALIN 150 MG: 150 CAPSULE ORAL at 09:30

## 2025-07-06 RX ADMIN — PREGABALIN 150 MG: 150 CAPSULE ORAL at 20:20

## 2025-07-06 RX ADMIN — ATORVASTATIN CALCIUM 20 MG: 20 TABLET, FILM COATED ORAL at 20:20

## 2025-07-06 RX ADMIN — Medication 1 CAPSULE: at 09:27

## 2025-07-06 RX ADMIN — RIVAROXABAN 10 MG: 10 TABLET, FILM COATED ORAL at 17:27

## 2025-07-06 RX ADMIN — DOCUSATE SODIUM 100 MG: 100 CAPSULE, LIQUID FILLED ORAL at 20:21

## 2025-07-06 RX ADMIN — HYDROCODONE BITARTRATE AND ACETAMINOPHEN 1 TABLET: 5; 325 TABLET ORAL at 23:53

## 2025-07-06 RX ADMIN — PANTOPRAZOLE SODIUM 40 MG: 40 TABLET, DELAYED RELEASE ORAL at 05:07

## 2025-07-06 RX ADMIN — METOPROLOL TARTRATE 12.5 MG: 25 TABLET, FILM COATED ORAL at 20:20

## 2025-07-06 RX ADMIN — DOCUSATE SODIUM 100 MG: 100 CAPSULE, LIQUID FILLED ORAL at 09:31

## 2025-07-06 RX ADMIN — TRAZODONE HYDROCHLORIDE 100 MG: 100 TABLET ORAL at 20:21

## 2025-07-06 RX ADMIN — METOPROLOL TARTRATE 12.5 MG: 25 TABLET, FILM COATED ORAL at 09:27

## 2025-07-06 RX ADMIN — Medication 1000 UNITS: at 09:27

## 2025-07-06 RX ADMIN — NALOXEGOL OXALATE 12.5 MG: 25 TABLET, FILM COATED ORAL at 05:07

## 2025-07-06 RX ADMIN — SODIUM CHLORIDE, PRESERVATIVE FREE 10 ML: 5 INJECTION INTRAVENOUS at 09:27

## 2025-07-06 RX ADMIN — PREGABALIN 150 MG: 150 CAPSULE ORAL at 14:32

## 2025-07-06 ASSESSMENT — PAIN DESCRIPTION - ORIENTATION: ORIENTATION: LEFT

## 2025-07-06 ASSESSMENT — PAIN DESCRIPTION - DESCRIPTORS: DESCRIPTORS: ACHING

## 2025-07-06 ASSESSMENT — PAIN DESCRIPTION - LOCATION: LOCATION: LEG

## 2025-07-06 ASSESSMENT — PAIN SCALES - GENERAL: PAINLEVEL_OUTOF10: 5

## 2025-07-07 LAB
ANION GAP SERPL CALC-SCNC: 13 MEQ/L (ref 8–16)
BUN SERPL-MCNC: 17 MG/DL (ref 8–23)
CALCIUM SERPL-MCNC: 8.9 MG/DL (ref 8.8–10.2)
CHLORIDE SERPL-SCNC: 104 MEQ/L (ref 98–111)
CO2 SERPL-SCNC: 24 MEQ/L (ref 22–29)
CREAT SERPL-MCNC: 0.6 MG/DL (ref 0.5–0.9)
DEPRECATED RDW RBC AUTO: 49.7 FL (ref 35–45)
ERYTHROCYTE [DISTWIDTH] IN BLOOD BY AUTOMATED COUNT: 14.9 % (ref 11.5–14.5)
GFR SERPL CREATININE-BSD FRML MDRD: > 90 ML/MIN/1.73M2
GLUCOSE SERPL-MCNC: 114 MG/DL (ref 74–109)
HCT VFR BLD AUTO: 25 % (ref 37–47)
HGB BLD-MCNC: 7.8 GM/DL (ref 12–16)
MCH RBC QN AUTO: 28.9 PG (ref 26–33)
MCHC RBC AUTO-ENTMCNC: 31.2 GM/DL (ref 32.2–35.5)
MCV RBC AUTO: 92.6 FL (ref 81–99)
PLATELET # BLD AUTO: 257 THOU/MM3 (ref 130–400)
PMV BLD AUTO: 9.7 FL (ref 9.4–12.4)
POTASSIUM SERPL-SCNC: 3.7 MEQ/L (ref 3.5–5.2)
RBC # BLD AUTO: 2.7 MILL/MM3 (ref 4.2–5.4)
SODIUM SERPL-SCNC: 141 MEQ/L (ref 135–145)
WBC # BLD AUTO: 6.4 THOU/MM3 (ref 4.8–10.8)

## 2025-07-07 PROCEDURE — 97110 THERAPEUTIC EXERCISES: CPT

## 2025-07-07 PROCEDURE — 6370000000 HC RX 637 (ALT 250 FOR IP): Performed by: PHYSICIAN ASSISTANT

## 2025-07-07 PROCEDURE — 97530 THERAPEUTIC ACTIVITIES: CPT

## 2025-07-07 PROCEDURE — 80048 BASIC METABOLIC PNL TOTAL CA: CPT

## 2025-07-07 PROCEDURE — 36415 COLL VENOUS BLD VENIPUNCTURE: CPT

## 2025-07-07 PROCEDURE — 85027 COMPLETE CBC AUTOMATED: CPT

## 2025-07-07 PROCEDURE — 99232 SBSQ HOSP IP/OBS MODERATE 35: CPT | Performed by: NURSE PRACTITIONER

## 2025-07-07 PROCEDURE — 1200000000 HC SEMI PRIVATE

## 2025-07-07 PROCEDURE — 6370000000 HC RX 637 (ALT 250 FOR IP): Performed by: STUDENT IN AN ORGANIZED HEALTH CARE EDUCATION/TRAINING PROGRAM

## 2025-07-07 PROCEDURE — 6370000000 HC RX 637 (ALT 250 FOR IP): Performed by: NURSE PRACTITIONER

## 2025-07-07 RX ORDER — ACETAMINOPHEN 325 MG/1
650 TABLET ORAL EVERY 6 HOURS PRN
Status: DISCONTINUED | OUTPATIENT
Start: 2025-07-07 | End: 2025-07-08 | Stop reason: HOSPADM

## 2025-07-07 RX ADMIN — ACETAMINOPHEN 650 MG: 325 TABLET ORAL at 18:10

## 2025-07-07 RX ADMIN — POLYETHYLENE GLYCOL 3350 17 G: 17 POWDER, FOR SOLUTION ORAL at 21:40

## 2025-07-07 RX ADMIN — ACETAMINOPHEN 650 MG: 325 TABLET ORAL at 12:31

## 2025-07-07 RX ADMIN — POLYETHYLENE GLYCOL 3350 17 G: 17 POWDER, FOR SOLUTION ORAL at 09:25

## 2025-07-07 RX ADMIN — PREGABALIN 150 MG: 150 CAPSULE ORAL at 21:41

## 2025-07-07 RX ADMIN — NALOXEGOL OXALATE 12.5 MG: 25 TABLET, FILM COATED ORAL at 06:27

## 2025-07-07 RX ADMIN — ATORVASTATIN CALCIUM 20 MG: 20 TABLET, FILM COATED ORAL at 21:40

## 2025-07-07 RX ADMIN — Medication 1 CAPSULE: at 09:25

## 2025-07-07 RX ADMIN — METOPROLOL TARTRATE 12.5 MG: 25 TABLET, FILM COATED ORAL at 21:41

## 2025-07-07 RX ADMIN — DOCUSATE SODIUM 100 MG: 100 CAPSULE, LIQUID FILLED ORAL at 21:41

## 2025-07-07 RX ADMIN — DOCUSATE SODIUM 100 MG: 100 CAPSULE, LIQUID FILLED ORAL at 09:25

## 2025-07-07 RX ADMIN — PREGABALIN 150 MG: 150 CAPSULE ORAL at 14:52

## 2025-07-07 RX ADMIN — FLUTICASONE PROPIONATE 1 SPRAY: 50 SPRAY, METERED NASAL at 14:52

## 2025-07-07 RX ADMIN — Medication 1000 UNITS: at 09:25

## 2025-07-07 RX ADMIN — RIVAROXABAN 10 MG: 10 TABLET, FILM COATED ORAL at 18:11

## 2025-07-07 RX ADMIN — PREGABALIN 150 MG: 150 CAPSULE ORAL at 09:25

## 2025-07-07 RX ADMIN — PANTOPRAZOLE SODIUM 40 MG: 40 TABLET, DELAYED RELEASE ORAL at 06:27

## 2025-07-07 RX ADMIN — METOPROLOL TARTRATE 12.5 MG: 25 TABLET, FILM COATED ORAL at 09:25

## 2025-07-07 ASSESSMENT — PAIN SCALES - GENERAL
PAINLEVEL_OUTOF10: 2
PAINLEVEL_OUTOF10: 5
PAINLEVEL_OUTOF10: 3
PAINLEVEL_OUTOF10: 9
PAINLEVEL_OUTOF10: 7

## 2025-07-07 ASSESSMENT — PAIN DESCRIPTION - ORIENTATION
ORIENTATION: LEFT

## 2025-07-07 ASSESSMENT — PAIN DESCRIPTION - LOCATION
LOCATION: LEG
LOCATION: HIP
LOCATION: HIP

## 2025-07-07 NOTE — CARE COORDINATION
7/7/25, 8:40 AM EDT    DISCHARGE PLANNING EVALUATION    Precert approved for [facility], can accept  at discharge, anticipated for tomorrow.  Spoke with patient, who is in agreement.

## 2025-07-08 VITALS
DIASTOLIC BLOOD PRESSURE: 57 MMHG | HEART RATE: 99 BPM | RESPIRATION RATE: 16 BRPM | WEIGHT: 97.66 LBS | TEMPERATURE: 98 F | OXYGEN SATURATION: 99 % | HEIGHT: 56 IN | SYSTOLIC BLOOD PRESSURE: 109 MMHG | BODY MASS INDEX: 21.97 KG/M2

## 2025-07-08 LAB
ANION GAP SERPL CALC-SCNC: 12 MEQ/L (ref 8–16)
BUN SERPL-MCNC: 16 MG/DL (ref 8–23)
CALCIUM SERPL-MCNC: 9.4 MG/DL (ref 8.8–10.2)
CHLORIDE SERPL-SCNC: 104 MEQ/L (ref 98–111)
CO2 SERPL-SCNC: 25 MEQ/L (ref 22–29)
CREAT SERPL-MCNC: 0.6 MG/DL (ref 0.5–0.9)
DEPRECATED RDW RBC AUTO: 50.3 FL (ref 35–45)
ERYTHROCYTE [DISTWIDTH] IN BLOOD BY AUTOMATED COUNT: 14.9 % (ref 11.5–14.5)
GFR SERPL CREATININE-BSD FRML MDRD: > 90 ML/MIN/1.73M2
GLUCOSE SERPL-MCNC: 108 MG/DL (ref 74–109)
HCT VFR BLD AUTO: 26.7 % (ref 37–47)
HGB BLD-MCNC: 8.4 GM/DL (ref 12–16)
MCH RBC QN AUTO: 29.4 PG (ref 26–33)
MCHC RBC AUTO-ENTMCNC: 31.5 GM/DL (ref 32.2–35.5)
MCV RBC AUTO: 93.4 FL (ref 81–99)
PLATELET # BLD AUTO: 287 THOU/MM3 (ref 130–400)
PMV BLD AUTO: 9.4 FL (ref 9.4–12.4)
POTASSIUM SERPL-SCNC: 4 MEQ/L (ref 3.5–5.2)
RBC # BLD AUTO: 2.86 MILL/MM3 (ref 4.2–5.4)
SODIUM SERPL-SCNC: 141 MEQ/L (ref 135–145)
WBC # BLD AUTO: 5.8 THOU/MM3 (ref 4.8–10.8)

## 2025-07-08 PROCEDURE — 6370000000 HC RX 637 (ALT 250 FOR IP): Performed by: PHYSICIAN ASSISTANT

## 2025-07-08 PROCEDURE — 6370000000 HC RX 637 (ALT 250 FOR IP): Performed by: STUDENT IN AN ORGANIZED HEALTH CARE EDUCATION/TRAINING PROGRAM

## 2025-07-08 PROCEDURE — 97535 SELF CARE MNGMENT TRAINING: CPT

## 2025-07-08 PROCEDURE — 99238 HOSP IP/OBS DSCHRG MGMT 30/<: CPT | Performed by: NURSE PRACTITIONER

## 2025-07-08 PROCEDURE — 6370000000 HC RX 637 (ALT 250 FOR IP): Performed by: NURSE PRACTITIONER

## 2025-07-08 PROCEDURE — 85027 COMPLETE CBC AUTOMATED: CPT

## 2025-07-08 PROCEDURE — 80048 BASIC METABOLIC PNL TOTAL CA: CPT

## 2025-07-08 PROCEDURE — 36415 COLL VENOUS BLD VENIPUNCTURE: CPT

## 2025-07-08 PROCEDURE — 97110 THERAPEUTIC EXERCISES: CPT

## 2025-07-08 RX ORDER — PSEUDOEPHEDRINE HCL 30 MG
100 TABLET ORAL 2 TIMES DAILY
DISCHARGE
Start: 2025-07-08

## 2025-07-08 RX ORDER — HYDROCODONE BITARTRATE AND ACETAMINOPHEN 5; 325 MG/1; MG/1
1 TABLET ORAL 2 TIMES DAILY PRN
Qty: 10 TABLET | Refills: 0 | Status: SHIPPED | OUTPATIENT
Start: 2025-07-08 | End: 2025-07-13

## 2025-07-08 RX ADMIN — DOCUSATE SODIUM 100 MG: 100 CAPSULE, LIQUID FILLED ORAL at 08:23

## 2025-07-08 RX ADMIN — ACETAMINOPHEN 650 MG: 325 TABLET ORAL at 08:26

## 2025-07-08 RX ADMIN — PANTOPRAZOLE SODIUM 40 MG: 40 TABLET, DELAYED RELEASE ORAL at 06:08

## 2025-07-08 RX ADMIN — FLUTICASONE PROPIONATE 1 SPRAY: 50 SPRAY, METERED NASAL at 08:23

## 2025-07-08 RX ADMIN — Medication 1 CAPSULE: at 08:23

## 2025-07-08 RX ADMIN — PREGABALIN 150 MG: 150 CAPSULE ORAL at 13:23

## 2025-07-08 RX ADMIN — Medication 1000 UNITS: at 08:23

## 2025-07-08 RX ADMIN — PREGABALIN 150 MG: 150 CAPSULE ORAL at 08:23

## 2025-07-08 RX ADMIN — POLYETHYLENE GLYCOL 3350 17 G: 17 POWDER, FOR SOLUTION ORAL at 08:23

## 2025-07-08 RX ADMIN — METOPROLOL TARTRATE 12.5 MG: 25 TABLET, FILM COATED ORAL at 08:24

## 2025-07-08 ASSESSMENT — PAIN DESCRIPTION - ONSET: ONSET: ON-GOING

## 2025-07-08 ASSESSMENT — PAIN SCALES - GENERAL
PAINLEVEL_OUTOF10: 9
PAINLEVEL_OUTOF10: 5
PAINLEVEL_OUTOF10: 7
PAINLEVEL_OUTOF10: 9
PAINLEVEL_OUTOF10: 0

## 2025-07-08 ASSESSMENT — PAIN - FUNCTIONAL ASSESSMENT: PAIN_FUNCTIONAL_ASSESSMENT: ACTIVITIES ARE NOT PREVENTED

## 2025-07-08 ASSESSMENT — PAIN DESCRIPTION - DIRECTION: RADIATING_TOWARDS: DOWN LEG

## 2025-07-08 ASSESSMENT — PAIN DESCRIPTION - DESCRIPTORS: DESCRIPTORS: ACHING

## 2025-07-08 ASSESSMENT — PAIN DESCRIPTION - LOCATION: LOCATION: HIP

## 2025-07-08 ASSESSMENT — PAIN DESCRIPTION - ORIENTATION: ORIENTATION: LEFT

## 2025-07-08 ASSESSMENT — PAIN DESCRIPTION - PAIN TYPE: TYPE: SURGICAL PAIN

## 2025-07-08 ASSESSMENT — PAIN DESCRIPTION - FREQUENCY: FREQUENCY: CONTINUOUS

## 2025-07-08 NOTE — PLAN OF CARE
Problem: Chronic Conditions and Co-morbidities  Goal: Patient's chronic conditions and co-morbidity symptoms are monitored and maintained or improved  7/2/2025 2238 by Patricia Irwin RN  Outcome: Progressing  Flowsheets (Taken 7/2/2025 2238)  Care Plan - Patient's Chronic Conditions and Co-Morbidity Symptoms are Monitored and Maintained or Improved:   Monitor and assess patient's chronic conditions and comorbid symptoms for stability, deterioration, or improvement   Collaborate with multidisciplinary team to address chronic and comorbid conditions and prevent exacerbation or deterioration   Update acute care plan with appropriate goals if chronic or comorbid symptoms are exacerbated and prevent overall improvement and discharge     Problem: Discharge Planning  Goal: Discharge to home or other facility with appropriate resources  7/2/2025 2238 by Patricia Irwin RN  Outcome: Progressing  Flowsheets (Taken 7/2/2025 2238)  Discharge to home or other facility with appropriate resources:   Identify barriers to discharge with patient and caregiver   Identify discharge learning needs (meds, wound care, etc)   Arrange for needed discharge resources and transportation as appropriate     Problem: Pain  Goal: Verbalizes/displays adequate comfort level or baseline comfort level  7/2/2025 2238 by Patricia Irwin RN  Outcome: Progressing  Flowsheets (Taken 7/2/2025 2238)  Verbalizes/displays adequate comfort level or baseline comfort level:   Encourage patient to monitor pain and request assistance   Administer analgesics based on type and severity of pain and evaluate response   Assess pain using appropriate pain scale   Implement non-pharmacological measures as appropriate and evaluate response     Problem: Safety - Adult  Goal: Free from fall injury  7/2/2025 2238 by Patricia Irwin RN  Outcome: Progressing  Flowsheets  Taken 7/2/2025 2238 by Patricia Irwin RN  Free From Fall Injury: Instruct family/caregiver on patient safety  Taken 
  Problem: Chronic Conditions and Co-morbidities  Goal: Patient's chronic conditions and co-morbidity symptoms are monitored and maintained or improved  Outcome: Progressing  Flowsheets (Taken 6/30/2025 2020)  Care Plan - Patient's Chronic Conditions and Co-Morbidity Symptoms are Monitored and Maintained or Improved:   Monitor and assess patient's chronic conditions and comorbid symptoms for stability, deterioration, or improvement   Collaborate with multidisciplinary team to address chronic and comorbid conditions and prevent exacerbation or deterioration   Update acute care plan with appropriate goals if chronic or comorbid symptoms are exacerbated and prevent overall improvement and discharge     Problem: Discharge Planning  Goal: Discharge to home or other facility with appropriate resources  Outcome: Progressing  Flowsheets  Taken 6/30/2025 2043  Discharge to home or other facility with appropriate resources:   Identify barriers to discharge with patient and caregiver   Arrange for needed discharge resources and transportation as appropriate   Identify discharge learning needs (meds, wound care, etc)  Taken 6/30/2025 2020  Discharge to home or other facility with appropriate resources:   Identify barriers to discharge with patient and caregiver   Arrange for needed discharge resources and transportation as appropriate   Identify discharge learning needs (meds, wound care, etc)     Problem: Pain  Goal: Verbalizes/displays adequate comfort level or baseline comfort level  Outcome: Progressing  Flowsheets (Taken 6/30/2025 2020)  Verbalizes/displays adequate comfort level or baseline comfort level:   Encourage patient to monitor pain and request assistance   Assess pain using appropriate pain scale   Administer analgesics based on type and severity of pain and evaluate response   Implement non-pharmacological measures as appropriate and evaluate response     Problem: Safety - Adult  Goal: Free from fall 
  Problem: Chronic Conditions and Co-morbidities  Goal: Patient's chronic conditions and co-morbidity symptoms are monitored and maintained or improved  Outcome: Progressing  Flowsheets (Taken 7/2/2025 1847)  Care Plan - Patient's Chronic Conditions and Co-Morbidity Symptoms are Monitored and Maintained or Improved: Monitor and assess patient's chronic conditions and comorbid symptoms for stability, deterioration, or improvement     Problem: Discharge Planning  Goal: Discharge to home or other facility with appropriate resources  Outcome: Progressing  Flowsheets (Taken 7/2/2025 1847)  Discharge to home or other facility with appropriate resources: Identify barriers to discharge with patient and caregiver       Problem: Discharge Planning  Goal: Discharge to home or other facility with appropriate resources  Outcome: Progressing  Flowsheets (Taken 7/2/2025 1847)  Discharge to home or other facility with appropriate resources: Identify barriers to discharge with patient and caregiver     Problem: Pain  Goal: Verbalizes/displays adequate comfort level or baseline comfort level  Outcome: Progressing  Flowsheets (Taken 7/2/2025 1847)  Verbalizes/displays adequate comfort level or baseline comfort level: Encourage patient to monitor pain and request assistance     Problem: Safety - Adult  Goal: Free from fall injury  Outcome: Progressing  Flowsheets (Taken 7/2/2025 1847)  Free From Fall Injury: Instruct family/caregiver on patient safety     Problem: ABCDS Injury Assessment  Goal: Absence of physical injury  Outcome: Progressing  Flowsheets (Taken 7/2/2025 1847)  Absence of Physical Injury: Implement safety measures based on patient assessment     Problem: Cardiovascular - Adult  Goal: Absence of cardiac dysrhythmias or at baseline  Outcome: Progressing  Flowsheets (Taken 7/2/2025 1847)  Absence of cardiac dysrhythmias or at baseline: Monitor cardiac rate and rhythm     Problem: Skin/Tissue Integrity - Adult  Goal: Skin 
  Problem: Chronic Conditions and Co-morbidities  Goal: Patient's chronic conditions and co-morbidity symptoms are monitored and maintained or improved  Outcome: Progressing  Flowsheets (Taken 7/5/2025 1100)  Care Plan - Patient's Chronic Conditions and Co-Morbidity Symptoms are Monitored and Maintained or Improved: Monitor and assess patient's chronic conditions and comorbid symptoms for stability, deterioration, or improvement     Problem: Discharge Planning  Goal: Discharge to home or other facility with appropriate resources  Outcome: Progressing  Flowsheets (Taken 7/5/2025 1100)  Discharge to home or other facility with appropriate resources: Identify barriers to discharge with patient and caregiver     Problem: Pain  Goal: Verbalizes/displays adequate comfort level or baseline comfort level  Outcome: Progressing     Problem: Safety - Adult  Goal: Free from fall injury  Outcome: Progressing     Problem: ABCDS Injury Assessment  Goal: Absence of physical injury  Outcome: Progressing     Problem: Cardiovascular - Adult  Goal: Absence of cardiac dysrhythmias or at baseline  Outcome: Progressing  Flowsheets (Taken 7/5/2025 1100)  Absence of cardiac dysrhythmias or at baseline:   Monitor cardiac rate and rhythm   Assess for signs of decreased cardiac output     Problem: Skin/Tissue Integrity - Adult  Goal: Skin integrity remains intact  Description: 1.  Monitor for areas of redness and/or skin breakdown  2.  Assess vascular access sites hourly  3.  Every 4-6 hours minimum:  Change oxygen saturation probe site  4.  Every 4-6 hours:  If on nasal continuous positive airway pressure, respiratory therapy assess nares and determine need for appliance change or resting period  Outcome: Progressing  Flowsheets (Taken 7/5/2025 1100)  Skin Integrity Remains Intact:   Monitor for areas of redness and/or skin breakdown   Assess vascular access sites hourly     Problem: Musculoskeletal - Adult  Goal: Maintain proper alignment of 
  Problem: Discharge Planning  Goal: Discharge to home or other facility with appropriate resources  Outcome: Progressing  Flowsheets (Taken 7/3/2025 2000)  Discharge to home or other facility with appropriate resources: Identify barriers to discharge with patient and caregiver     Problem: Pain  Goal: Verbalizes/displays adequate comfort level or baseline comfort level  Outcome: Progressing  Flowsheets (Taken 7/3/2025 2101)  Verbalizes/displays adequate comfort level or baseline comfort level: Encourage patient to monitor pain and request assistance     Problem: Safety - Adult  Goal: Free from fall injury  Outcome: Progressing     
  Problem: Safety - Adult  Goal: Free from fall injury  Outcome: Progressing     Problem: Chronic Conditions and Co-morbidities  Goal: Patient's chronic conditions and co-morbidity symptoms are monitored and maintained or improved  Outcome: Progressing     
Considering snf  
cardiac dysrhythmias or at baseline  Outcome: Progressing  Flowsheets (Taken 7/3/2025 2000 by Venus Campos, RN)  Absence of cardiac dysrhythmias or at baseline: Monitor cardiac rate and rhythm     Problem: Skin/Tissue Integrity - Adult  Goal: Skin integrity remains intact  Description: 1.  Monitor for areas of redness and/or skin breakdown  2.  Assess vascular access sites hourly  3.  Every 4-6 hours minimum:  Change oxygen saturation probe site  4.  Every 4-6 hours:  If on nasal continuous positive airway pressure, respiratory therapy assess nares and determine need for appliance change or resting period  Outcome: Progressing  Flowsheets (Taken 7/4/2025 0819)  Skin Integrity Remains Intact:   Monitor for areas of redness and/or skin breakdown   Turn and reposition as indicated     Problem: Musculoskeletal - Adult  Goal: Maintain proper alignment of affected body part  Outcome: Progressing  Flowsheets (Taken 7/2/2025 2238 by Patricia Irwin, RN)  Maintain proper alignment of affected body part: Support and protect limb and body alignment per provider's orders     Problem: Genitourinary - Adult  Goal: Urinary catheter remains patent  Outcome: Progressing  Flowsheets (Taken 7/2/2025 2238 by Patricia Irwin, RN)  Urinary catheter remains patent: Assess patency of urinary catheter     Problem: Infection - Adult  Goal: Absence of infection at discharge  Outcome: Progressing  Flowsheets (Taken 7/2/2025 2238 by Patricia Irwin, RN)  Absence of infection at discharge:   Assess and monitor for signs and symptoms of infection   Monitor lab/diagnostic results   Monitor all insertion sites i.e., indwelling lines, tubes and drains   Administer medications as ordered   Instruct and encourage patient and family to use good hand hygiene technique     Problem: Skin/Tissue Integrity  Goal: Skin integrity remains intact  Description: 1.  Monitor for areas of redness and/or skin breakdown  2.  Assess vascular access sites hourly  3.  
influences on pain and pain management   Notify Licensed Independent Practitioner if interventions unsuccessful or patient reports new pain     Problem: Safety - Adult  Goal: Free from fall injury  7/1/2025 1240 by Julianna Carlin RN  Outcome: Progressing  Flowsheets (Taken 6/30/2025 2255 by Yamila Walsh, RN)  Free From Fall Injury:   Instruct family/caregiver on patient safety   Based on caregiver fall risk screen, instruct family/caregiver to ask for assistance with transferring infant if caregiver noted to have fall risk factors     Problem: ABCDS Injury Assessment  Goal: Absence of physical injury  7/1/2025 1240 by Julianna Carlin RN  Outcome: Progressing  Flowsheets (Taken 6/30/2025 2255 by Yamila Walsh, RN)  Absence of Physical Injury: Implement safety measures based on patient assessment     Problem: Cardiovascular - Adult  Goal: Absence of cardiac dysrhythmias or at baseline  7/1/2025 1240 by Julianna Carlin RN  Outcome: Progressing  Flowsheets (Taken 7/1/2025 0958)  Absence of cardiac dysrhythmias or at baseline:   Monitor cardiac rate and rhythm   Assess for signs of decreased cardiac output     Problem: Skin/Tissue Integrity - Adult  Goal: Skin integrity remains intact  7/1/2025 1240 by Julianna Carlin RN  Outcome: Progressing  Flowsheets (Taken 7/1/2025 0958)  Skin Integrity Remains Intact:   Monitor for areas of redness and/or skin breakdown   Assess vascular access sites hourly   Turn and reposition as indicated   Check visual cues for pain   Monitor skin under medical devices     Problem: Musculoskeletal - Adult  Goal: Maintain proper alignment of affected body part  7/1/2025 1240 by Julianna Carlin RN  Outcome: Progressing  Flowsheets (Taken 7/1/2025 0958)  Maintain proper alignment of affected body part:   Support and protect limb and body alignment per provider's orders   Instruct and reinforce with patient and family use of appropriate assistive device and 
hip dislocation precautions)     Problem: Genitourinary - Adult  Goal: Urinary catheter remains patent  7/1/2025 2310 by Jace Simmons RN  Outcome: Progressing  Flowsheets (Taken 7/1/2025 2015)  Urinary catheter remains patent: Assess patency of urinary catheter  7/1/2025 1240 by Julianna Carlin RN  Outcome: Progressing  Flowsheets  Taken 7/1/2025 1240  Urinary catheter remains patent: Assess patency of urinary catheter  Taken 7/1/2025 0958  Urinary catheter remains patent: Assess patency of urinary catheter     Problem: Infection - Adult  Goal: Absence of infection at discharge  7/1/2025 2310 by Jace Simmons RN  Outcome: Progressing  Flowsheets (Taken 7/1/2025 2015)  Absence of infection at discharge:   Assess and monitor for signs and symptoms of infection   Monitor lab/diagnostic results   Monitor all insertion sites i.e., indwelling lines, tubes and drains   Administer medications as ordered   Instruct and encourage patient and family to use good hand hygiene technique  7/1/2025 1240 by Julianna Carlin RN  Outcome: Progressing  Flowsheets (Taken 7/1/2025 0958)  Absence of infection at discharge:   Assess and monitor for signs and symptoms of infection   Monitor lab/diagnostic results   Monitor all insertion sites i.e., indwelling lines, tubes and drains   [place] appropriate cooling/warming therapies per order   Administer medications as ordered   Instruct and encourage patient and family to use good hand hygiene technique     Problem: Skin/Tissue Integrity  Goal: Skin integrity remains intact  Description: 1.  Monitor for areas of redness and/or skin breakdown  2.  Assess vascular access sites hourly  3.  Every 4-6 hours minimum:  Change oxygen saturation probe site  4.  Every 4-6 hours:  If on nasal continuous positive airway pressure, respiratory therapy assess nares and determine need for appliance change or resting period  7/1/2025 2310 by Jace Simmons RN  Outcome:

## 2025-07-08 NOTE — DISCHARGE SUMMARY
Physician Discharge Summary     Patient ID:  Naina Santos  [id number]  64 y.o.  1960    Admit date: 6/30/2025    Discharge date and time: 7/8/25    Admitting Physician: Valentin Jimenez MD     Discharge Physician: Valentin Jimenez MD     Admission Diagnoses: Closed displaced comminuted fracture of shaft of left femur, initial encounter (Formerly [facility]) [S72.352A]    Discharge Diagnoses: Closed displaced comminuted fracture of shaft of left femur, initial encounter (Formerly [facility]) [S72.352A]    Admission Condition: fair    Discharged Condition: fair    Indication for Admission: unstable orthopaedic injury    Hospital Course: Patient is now s/p ORIF L Femur on 7/1/25 by Dr. Jimenez. Patient presented to the ED on 6/30/25 after sustaining a L distal femur fx. Surgical intervention was recommended, patient agreeable to proceed. Hospitalist was consulted for perioperative risk assessment, medication reconciliation, and medical management HTN HLD Gerd. Optimized from consulting services. On the day of surgery, patient was identified in the pre-operative holding area and agreeable to proceed with surgery. Written consent was obtained. Please see operative note for further details of this procedure. Patient received kaylan-operative antibiotics. Patient recovered in PACU before transfer to a regular nursing floor. Patient was started on tylenol and norco for pain control and Xarelto. Patient advanced diet and there were no adverse events on the floor, she was confused but we found this to be driven to narcotic use, resolved with holding po meds and her advancedimaging was negative for acute process. Patient was found to be stable and suitable for discharge with consulting services. On the day of discharge, patient was afebrile with stable vital signs, stable upon physical exam. Patient was discharged with prescriptions for pain and dvt ppx. Patient was deemed stable for discharge to SNF as recommended by PT/OT. Patient will follow up with

## 2025-07-08 NOTE — CARE COORDINATION
7/8/25, 9:03 AM EDT    DISCHARGE PLANNING EVALUATION      [facility] approved for [facility], discharge written for today, transport is available at 2:30 pm.  Spoke with daughter, who wanted to speak with patient's RN, daughter has called back to confirm agreement with discharge today.      Confirmed plan with [facility].     7/8/25, 9:04 AM EDT    Patient goals/plan/ treatment preferences discussed by  and .  Patient goals/plan/ treatment preferences reviewed with patient/ family.  Patient/ family verbalize understanding of discharge plan and are in agreement with goal/plan/treatment preferences.  Understanding was demonstrated using the teach back method.  AVS provided by RN at time of discharge, which includes all necessary medical information pertaining to the patients current course of illness, treatment, post-discharge goals of care, and treatment preferences.     Services At/After Discharge: Skilled Nursing Facility (SNF) and In ambulance

## 2025-07-08 NOTE — PROGRESS NOTES
Hospitalist Progress Note      Patient:  Naina Santos 64 y.o. female     : 1960  Unit/Bed:-04004-A  Date of Admission: 2025        Assessment/Plan:     Pre-Operative Risk assessment using 2014 ACC/AHA guidelines      Emergent procedure No  Active Cardiac Condition No (decompensated HF, Arrhythmia, MI <3 weeks, severe valve disease)  Risk Level of Procedure Intermediate Risk (intraperitoneal, intrathoracic, HENT, orthopedic, or carotid endarterectomy, etc.)  Revised Cardiac Risk Index Risk factors: History of cerebrovascular disease. This correlates to a 6.0% 30-day risk of death, MI, or cardiac arrest.   Measurement of Exercise Tolerance before Surgery >4 No     According to the 2014 ACC/AHA pre-operative risk assessment guidelines Naina Santos is a medium risk for major cardiac complications during a intermediate risk procedure.  The risks and benefits were discussed at length with the patient who understands and accepts the risk, is agreeable to proceed with surgery as planned.  No other workup currently recommended prior to surgery she is as optimized as possible        Left femur fracture s/p mechanical fall: Hx of left hip arthroplasty.  Tripped over walker and stairs on patio.  XR of left femur shows a fracture involving the fracture involving the proximal left femur adjacent to femoral stem component.   Currently in Flowers's traction.   Pain control and traction managed per primary.   Surgical intervention planned for today     HTN: Stable.   Continue home Metoprolol.     GERD:   Continue home PPI.       Hx of CVA: Reports no residual deficits   Hold ASA until ok with primary to resume.   Continue Statin.      HLD:  Continue home Statin.      Hx of osteogenesis imperfecta:  Continue Vitamin D supplements.            LDA: []CVC / []PICC / []Midline / [][place] / []Drains / []Mediport / [x]None  Antibiotics: No  Steroids: No  Labs: [x]Yes / []No  IVF: [x]Yes / []No     Level of care: []Step 
    Hospitalist Progress Note      Patient:  Naina Santos 64 y.o. female     : 1960  Unit/Bed:-04004-A  Date of Admission: 2025        Assessment/Plan:     Pre-Operative Risk assessment using 2014 ACC/AHA guidelines      Emergent procedure No  Active Cardiac Condition No (decompensated HF, Arrhythmia, MI <3 weeks, severe valve disease)  Risk Level of Procedure Intermediate Risk (intraperitoneal, intrathoracic, HENT, orthopedic, or carotid endarterectomy, etc.)  Revised Cardiac Risk Index Risk factors: History of cerebrovascular disease. This correlates to a 6.0% 30-day risk of death, MI, or cardiac arrest.   Measurement of Exercise Tolerance before Surgery >4 No     According to the 2014 ACC/AHA pre-operative risk assessment guidelines Naina Santos is a medium risk for major cardiac complications during a intermediate risk procedure.  The risks and benefits were discussed at length with the patient who understands and accepts the risk, is agreeable to proceed with surgery as planned.  No other workup currently recommended prior to surgery she is as optimized as possible      Left femur fracture s/p mechanical fall: Hx of left hip arthroplasty.  Tripped over walker and stairs on patio.  XR of left femur shows a fracture involving the fracture involving the proximal left femur adjacent to femoral stem component.   Pain control  managed per primary.   Status post open reduction internal fixation on    Follow-up vitamin D level as well as PTH       Acute blood loss anemia  Multifactorial due to femur fracture and EBL in surgery   Baseline H&H 10/35, down to 6.1/19.3 post-op  Transfused with 1 unit PRBCs on   Maintain Hgb above 7.0  Repeat Hgb 8.0/25.1 this morning    Acute metabolic encephalopathy  Likely secondary to analgesia and narcotic administration  Will discontinue Tramadol and resume Norco as she takes at home     HTN: Stable.   Continue home Metoprolol with holding parameters   
    Hospitalist Progress Note      Patient:  Naina Santos 64 y.o. female     : 1960  Unit/Bed:7-04/004-A  Date of Admission: 2025        Assessment/Plan:     Pre-Operative Risk assessment using 2014 ACC/AHA guidelines      Emergent procedure No  Active Cardiac Condition No (decompensated HF, Arrhythmia, MI <3 weeks, severe valve disease)  Risk Level of Procedure Intermediate Risk (intraperitoneal, intrathoracic, HENT, orthopedic, or carotid endarterectomy, etc.)  Revised Cardiac Risk Index Risk factors: History of cerebrovascular disease. This correlates to a 6.0% 30-day risk of death, MI, or cardiac arrest.   Measurement of Exercise Tolerance before Surgery >4 No     According to the 2014 ACC/AHA pre-operative risk assessment guidelines Naina Santos is a medium risk for major cardiac complications during a intermediate risk procedure.  The risks and benefits were discussed at length with the patient who understands and accepts the risk, is agreeable to proceed with surgery as planned.  No other workup currently recommended prior to surgery she is as optimized as possible        Left femur fracture s/p mechanical fall: Hx of left hip arthroplasty.  Tripped over walker and stairs on patio.  XR of left femur shows a fracture involving the fracture involving the proximal left femur adjacent to femoral stem component.   Pain control  managed per primary.   Status post open reduction internal fixation on    Follow-up vitamin D level as well as PTH         Acute blood loss anemia, 2 intraoperative blood loss currently no signs or symptoms of active bleed, primary service rendered blood transfusion before, continue to monitor closely and follow-up repeat hemoglobin    HTN: Stable.   Continue home Metoprolol with holding parameters(was held this morning)     GERD:   Continue home PPI.       Hx of CVA: Reports no residual deficits   Hold ASA until ok with primary to resume.   Continue Statin.    
    Hospitalist Progress Note      Patient:  Naina Santos 64 y.o. female     : 1960  Unit/Bed:[facility]-A  Date of Admission: 2025        Assessment/Plan:     Left femur fracture s/p mechanical fall: Hx of left hip arthroplasty.  Tripped over walker and stairs on patio.  XR of left femur shows a fracture involving the fracture involving the proximal left femur adjacent to femoral stem component.   Pain control  managed per primary.   Status post open reduction internal fixation on    Vitamin D level 72, PTH 90.0.  Consider further outpatient work-up with parathyroid scan.         Acute blood loss anemia  Multifactorial due to femur fracture and EBL in surgery   Baseline H&H 10/35, down to 6.1/19.3 post-op  Transfused with 1 unit PRBCs on   Maintain Hgb above 7.0  Repeat Hgb remains stable without need for transfusion    Acute metabolic encephalopathy  Likely secondary to analgesia and narcotic administration  CT head on  without acute intracranial findings  Will discontinue Tramadol and resume Norco as she takes at home   Home dose of Trazodone increased on  in effort to improve sleep as she is suffering from insomnia likely contributing to above.    Ammonia level, UA, CXR all WNL  MRI of the brain obtained on , indicating mild atrophy and probable ischemic changes in the white matter, no evidence of acute infarct.  Small old infarct in the right subinsular cortex.   : Stop all narcotics and give Tylenol for pain  : Confusion has resolved.      HTN: Stable.   Continue home Metoprolol with holding parameters     GERD:   Continue home PPI.       Hx of CVA: Reports no residual deficits   Hold ASA until ok with primary to resume.   Continue Statin.      HLD:  Continue home Statin.      Hx of osteogenesis imperfecta:  Continue Vitamin D supplements.            LDA: []CVC / []PICC / []Midline / [x][place] / []Drains / []Mediport / []None  Antibiotics: Perioperative 
    Hospitalist Progress Note      Patient:  Naina Santos 64 y.o. female     : 1960  Unit/Bed:[facility]004-A  Date of Admission: 2025        Assessment/Plan:     Pre-Operative Risk assessment using 2014 ACC/AHA guidelines      Emergent procedure No  Active Cardiac Condition No (decompensated HF, Arrhythmia, MI <3 weeks, severe valve disease)  Risk Level of Procedure Intermediate Risk (intraperitoneal, intrathoracic, HENT, orthopedic, or carotid endarterectomy, etc.)  Revised Cardiac Risk Index Risk factors: History of cerebrovascular disease. This correlates to a 6.0% 30-day risk of death, MI, or cardiac arrest.   Measurement of Exercise Tolerance before Surgery >4 No     According to the 2014 ACC/AHA pre-operative risk assessment guidelines Naina Santos is a medium risk for major cardiac complications during a intermediate risk procedure.  The risks and benefits were discussed at length with the patient who understands and accepts the risk, is agreeable to proceed with surgery as planned.  No other workup currently recommended prior to surgery she is as optimized as possible      Left femur fracture s/p mechanical fall: Hx of left hip arthroplasty.  Tripped over walker and stairs on patio.  XR of left femur shows a fracture involving the fracture involving the proximal left femur adjacent to femoral stem component.   Pain control  managed per primary.   Status post open reduction internal fixation on    Vitamin D level 72, PTH 90.0.  Consider further outpatient work-up with parathyroid scan.         Acute blood loss anemia  Multifactorial due to femur fracture and EBL in surgery   Baseline H&H 10/35, down to 6.1/19.3 post-op  Transfused with 1 unit PRBCs on   Maintain Hgb above 7.0  Repeat Hgb 8.0/25.1  and again today 8.0/24.2    Acute metabolic encephalopathy  Likely secondary to analgesia and narcotic administration  CT head on  without acute intracranial findings  Will discontinue 
    Hospitalist Progress Note      Patient:  Naina Santos 64 y.o. female     : 1960  Unit/Bed:[facility]-A  Date of Admission: 2025        Assessment/Plan:     Left femur fracture s/p mechanical fall: Hx of left hip arthroplasty.  Tripped over walker and stairs on patio.  XR of left femur shows a fracture involving the fracture involving the proximal left femur adjacent to femoral stem component.   Pain control  managed per primary.   Status post open reduction internal fixation on    Vitamin D level 72, PTH 90.0.  Consider further outpatient work-up with parathyroid scan.         Acute blood loss anemia  Multifactorial due to femur fracture and EBL in surgery   Baseline H&H 10/35, down to 6.1/19.3 post-op  Transfused with 1 unit PRBCs on   Maintain Hgb above 7.0  Repeat Hgb remains stable without need for transfusion    Acute metabolic encephalopathy  Likely secondary to analgesia and narcotic administration  CT head on  without acute intracranial findings  Will discontinue Tramadol and resume Norco as she takes at home   Home dose of Trazodone increased on  in effort to improve sleep as she is suffering from insomnia likely contributing to above.    Ammonia level, UA, CXR all WNL  MRI of the brain obtained on , indicating mild atrophy and probable ischemic changes in the white matter, no evidence of acute infarct.  Small old infarct in the right subinsular cortex.   : Stop all narcotics and give Tylenol for pain    HTN: Stable.   Continue home Metoprolol with holding parameters     GERD:   Continue home PPI.       Hx of CVA: Reports no residual deficits   Hold ASA until ok with primary to resume.   Continue Statin.      HLD:  Continue home Statin.      Hx of osteogenesis imperfecta:  Continue Vitamin D supplements.            LDA: []CVC / []PICC / []Midline / [x]Frnace / []Drains / []Mediport / []None  Antibiotics: Perioperative Ancef  Steroids: No  Labs: [x]Yes / []No  IVF: 
  Physician Progress Note      PATIENT:               ALEX WALTERS  CSN #:                  [id number]  :                       1960  ADMIT DATE:       2025 4:43 PM  DISCH DATE:  RESPONDING  PROVIDER #:        Mojgan Boston CNP          QUERY TEXT:    Please clarify whether the left femur fracture documented H&P by Mojgan Boston APRN - CNP at 2025 is related to a traumatic or non-traumatic   cause or following a minor injury that would not routinely break a healthy   bone:    The clinical indicators include:  64 Years old female with fall, osteogenesis imperfecta,    -\"Left femur fracture s/p mechanical fall: Hx of left hip arthroplasty.    Tripped over walker and stairs on patio.  XR of left femur shows a fracture   involving the fracture involving the proximal left femur adjacent to femoral   stem component of osteogenesis imperfecta:  Continue Vitamin D supplements.\"-H&P by Mojgan Boston APRN - CNP at   2025    -Flowers's traction, planning for Surgical intervention, image studies, vit.D    Thank you    Raffi Pillai [facility]  ,  CDS  Options provided:  -- Traumatic fracture of left femur  -- Pathological fracture of left femur related to osteogenesis imperfecta  -- Other - I will add my own diagnosis  -- Disagree - Not applicable / Not valid  -- Disagree - Clinically unable to determine / Unknown  -- Refer to Clinical Documentation Reviewer    PROVIDER RESPONSE TEXT:    The patient has a Traumatic fracture of left femur.    Query created by: Raffi Pillai on 2025 4:58 AM      Electronically signed by:  Mojgan Boston CNP 2025 3:22 AM          
 [facility]  INPATIENT PHYSICAL THERAPY  DAILY NOTE  [facility] ORTHOPEDICS 7K - 7K-04/004-A      Discharge Recommendations: Subacute/Skilled Nursing Facility  Equipment Recommendations:    monitor for needs            Time In: 0820  Time Out: 0843  Timed Code Treatment Minutes: 23 Minutes  Minutes: 23          Date: 2025  Patient Name: Naina Santos,  Gender:  female        MRN: [id number]  : 1960  (64 y.o.)     Referring Practitioner: Nancy Colin APRN - CNP  Diagnosis: Closed displaced comminuted fracture of shaft of left femur, initial encounter ([facility])  Additional Pertinent Hx: The patient is a 64 y.o. female with a past medical history significant for osteogenesis imperfecta, HTN, and stroke who was transferred from outside facility for left periprosthetic femoral shaft fracture.  Patient states earlier this morning she got up to water flowers and fell off her front porch.  She does not remember how she fell.  Denies hitting her head.  Denies loss of consciousness.  Denies any additional injuries.  Pain is localized to the left thigh.  Knee immobilizer in place.  Patient did have a right proximal tibia fracture in February, which was treated conservatively.  She states she was just recovering/bouncing back from this fracture.  Patient states that she lives at home with her daughter, daughter-in-law, and granddaughter.  She has additional family nearby.  Ambulates with walker at all times.  Open treatment left periprosthetic femoral shaft fracture with plate and screws on 25.     Prior Level of Function:  Lives With: Family, Daughter  Type of Home: House  Home Layout: One level  Home Access: Ramped entrance  Home Equipment: Walker - Rolling, Wheelchair - Manual   Bathroom Shower/Tub: Tub/Shower unit  Bathroom Toilet: Standard  Bathroom Equipment: Grab bars in shower, Grab bars around toilet    Prior Level of Assist for ADLs: Independent  Prior Level of Assist for Homemaking: 
 [facility]  INPATIENT PHYSICAL THERAPY  DAILY NOTE  [facility] ORTHOPEDICS 7K - 7K-04/004-A      Discharge Recommendations: Subacute/Skilled Nursing Facility  Equipment Recommendations:    monitor for needs            Time In: 1046  Time Out: 1113  Timed Code Treatment Minutes: 27 Minutes  Minutes: 27          Date: 2025  Patient Name: Naina Santos,  Gender:  female        MRN: [id number]  : 1960  (64 y.o.)     Referring Practitioner: Nancy Colin APRN - CNP  Diagnosis: Closed displaced comminuted fracture of shaft of left femur, initial encounter ([facility])  Additional Pertinent Hx: The patient is a 64 y.o. female with a past medical history significant for osteogenesis imperfecta, HTN, and stroke who was transferred from outside facility for left periprosthetic femoral shaft fracture.  Patient states earlier this morning she got up to water flowers and fell off her front porch.  She does not remember how she fell.  Denies hitting her head.  Denies loss of consciousness.  Denies any additional injuries.  Pain is localized to the left thigh.  Knee immobilizer in place.  Patient did have a right proximal tibia fracture in February, which was treated conservatively.  She states she was just recovering/bouncing back from this fracture.  Patient states that she lives at home with her daughter, daughter-in-law, and granddaughter.  She has additional family nearby.  Ambulates with walker at all times.  Open treatment left periprosthetic femoral shaft fracture with plate and screws on 25.     Prior Level of Function:  Lives With: Family, Daughter  Type of Home: House  Home Layout: One level  Home Access: Ramped entrance  Home Equipment: Walker - Rolling, Wheelchair - Manual   Bathroom Shower/Tub: Tub/Shower unit  Bathroom Toilet: Standard  Bathroom Equipment: Grab bars in shower, Grab bars around toilet    Prior Level of Assist for ADLs: Independent  Prior Level of Assist for Homemaking: 
1426 Patient arrives to PACU, responsive to voice. Upon arrival pt has no IV. VSS    1437 New IV placed in L forearm.     1442 Patient states pain is a 9/10.    1443 Patient medicated with 50 mcg of Fentanyl for pain.     1445 Incision site redressed due to bleeding at site.    1450 Patient states pain is improved, rates it a 5/10 and tolerable.     1500 Pt resting in bed, resp easy and unlabored on 2L NC. Pain tolerable.     1502 Attempted to call report, julianna DUDLEY stated she would return call    1503 Patient meets criteria for discharge from PACU.    1510 Patient continues to rest in bed alert and oriented. Resp easy and unlabored.VSS    1513 Report called to Julianna DUDLEY. Awaiting transport    1520 Patient to  in stable condition. Pt daughter, Margareth called and updated.    
[facility]  INPATIENT PHYSICAL THERAPY  EVALUATION  [facility] ORTHOPEDICS 7K - 7K-04/004-A    Discharge Recommendations: Subacute/Skilled Nursing Facility, 24 hour supervision or assist, Therapy recommended at discharge  Equipment Recommendations:    monitor for needs          Time In: 1201  Time Out: 1218  Timed Code Treatment Minutes: 10 Minutes  Minutes: 17          Date: 7/3/2025  Patient Name: Naina Santos,  Gender:  female        MRN: [id number]  : 1960  (64 y.o.)      Referring Practitioner: Nancy Colin APRN - CNP  Diagnosis: Closed displaced comminuted fracture of shaft of left femur, initial encounter ([facility])  Additional Pertinent Hx: The patient is a 64 y.o. female with a past medical history significant for osteogenesis imperfecta, HTN, and stroke who was transferred from outside facility for left periprosthetic femoral shaft fracture.  Patient states earlier this morning she got up to water flowers and fell off her front porch.  She does not remember how she fell.  Denies hitting her head.  Denies loss of consciousness.  Denies any additional injuries.  Pain is localized to the left thigh.  Knee immobilizer in place.  Patient did have a right proximal tibia fracture in February, which was treated conservatively.  She states she was just recovering/bouncing back from this fracture.  Patient states that she lives at home with her daughter, daughter-in-law, and granddaughter.  She has additional family nearby.  Ambulates with walker at all times.  Open treatment left periprosthetic femoral shaft fracture with plate and screws on 25.     Restrictions/Precautions:  Restrictions/Precautions: Weight Bearing, Fall Risk, General Precautions  Left Lower Extremity Weight Bearing: Non Weight Bearing    Other Position/Activity Restrictions: monitor hgb    Required Braces or Orthoses?: No      Subjective:  Chart Reviewed: Yes  Patient assessed for rehabilitation services?: 
[facility]  OCCUPATIONAL THERAPY MISSED TREATMENT NOTE  [facility] ORTHOPEDICS 7K  7K-04/004-A      Date: 2025  Patient Name: Naina Santos        CSN: [id number]   : 1960  (64 y.o.)  Gender: female                REASON FOR MISSED TREATMENT: Hold Treatment per Nursing. Pt has hgb of 6.1 this AM. OT will hold at this time.                
[facility]  INPATIENT OCCUPATIONAL THERAPY  [facility] 7K  EVALUATION      Discharge Recommendations: Inpatient therapy stay (Subacute/Skilled Nursing Facility vs. IPR), Continue to assess pending progress, 24 hour supervision or assist, Patient would benefit from continued therapy after discharge, Equipment Recommendations: Yes        Time In: 08  Time Out: 08  Timed Code Treatment Minutes: 30 Minutes  Minutes: 43          Date: 7/3/2025  Patient Name: Naina Santos,   Gender: female      MRN: [id number]  : 1960  (64 y.o.)  Referring Practitioner: Nancy Colin APRN - CNP  Diagnosis: Closed displaced comminuted fracture of shaft of left femur, initial encounter ([facility])  Additional Pertinent Hx: Naina Santos is a 64 y.o. female with PMHx of HTN, CVA, osteogenesis imperfecta, GERD,   who presented to [facility] with chief complaint of left hip pain. The patient was transferred from an outside facility after sustaining a fall.  She was out trying to water her flowers when she fell off her front porch steps, sustaining a left periprosthetic femoral shaft fracture. She thinks she got tripped up on her walker, which she uses to ambulate at all times.  : pt underwent LEFT FEMUR OPEN REDUCTION INTERNAL FIXATION. pt is NWB to L LE. low hgb with transfusion on .    Restrictions/Precautions:  Restrictions/Precautions: Weight Bearing, Fall Risk  Left Lower Extremity Weight Bearing: Non Weight Bearing  Position Activity Restriction  Other Position/Activity Restrictions: monitor hgb    Subjective  Chart Reviewed: Yes, Orders, Progress Notes, History and Physical, Imaging, Operative Notes  Patient assessed for rehabilitation services?: Yes  Family / Caregiver Present: No    Subjective: Rn approved session. pt met in bed and agreeable to therapy with some confusion    Pain: pt not able to report pain but does endorse that she pain that increases with movement.     Vitals: Vitals not assessed 
Orthopaedic Progress Note      SUBJECTIVE     Ms. Santos is hospital day 2, L proximal femur fx    Seen at bedside this morning  no adverse events overnight  NPO out of caution of OR  [place] applied LLE   Pain well controlled in traction  No other msk concerns      OBJECTIVE      Physical    VITALS:  BP (!) 104/53   Pulse 92   Temp 98.1 °F (36.7 °C) (Oral)   Resp 16   Ht 1.422 m (4' 8\")   Wt 54.4 kg (120 lb)   SpO2 95%   BMI 26.90 kg/m²   No intake/output data recorded.      5/10 pain  Gen: alert and oriented  Head: normorcephalic, atraumatic  Resp: unlabored, NC  Pelvis: stable  LLE: bucks stable without skin breakdown, compartments soft compressible, gastroc ta ehl intact. Foot warm well perfused. SILT      Data  CBC:   Lab Results   Component Value Date/Time    WBC 7.1 06/30/2025 05:29 PM    HGB 10.4 06/30/2025 05:29 PM     06/30/2025 05:29 PM     BMP:    Lab Results   Component Value Date/Time     06/30/2025 05:29 PM    K 4.4 06/30/2025 05:29 PM    K 4.1 12/27/2021 10:45 AM     06/30/2025 05:29 PM    CO2 18 06/30/2025 05:29 PM    BUN 15 06/30/2025 05:29 PM    CREATININE 0.6 06/30/2025 05:29 PM    CALCIUM 8.5 06/30/2025 05:29 PM    GLUCOSE 118 06/30/2025 05:29 PM     Uric Acid:  No components found for: \"URIC\"  PT/INR:    Lab Results   Component Value Date/Time    INR 0.99 10/20/2021 01:33 PM     Troponin:  No results found for: \"TROPONINI\"  Urine Culture:  No components found for: \"CURINE\"      Current Inpatient Medications    Current Facility-Administered Medications: sodium chloride flush 0.9 % injection 5-40 mL, 5-40 mL, IntraVENous, 2 times per day  sodium chloride flush 0.9 % injection 5-40 mL, 5-40 mL, IntraVENous, PRN  0.9 % sodium chloride infusion, , IntraVENous, PRN  ondansetron (ZOFRAN-ODT) disintegrating tablet 4 mg, 4 mg, Oral, Q8H PRN **OR** ondansetron (ZOFRAN) injection 4 mg, 4 mg, IntraVENous, Q6H PRN  polyethylene glycol (GLYCOLAX) packet 17 g, 17 g, Oral, Daily 
Orthopaedic Progress Note      SUBJECTIVE     Ms. Santos is post op day # 1 ORIF L femur     Seen at bedside this morning  Some confusion overnight, apparently got out of bed, repeat imaging stable   Pain appears well controlled, tolerating oral diet  Denies any numbness/paresthesia in operative extremity      OBJECTIVE      Physical    VITALS:  BP (!) 107/51   Pulse 87   Temp 97.9 °F (36.6 °C) (Oral)   Resp 17   Ht 1.422 m (4' 8\")   Wt 54.4 kg (120 lb)   SpO2 95%   BMI 26.90 kg/m²   I/O last 3 completed shifts:  In: 2005 [P.O.:650; I.V.:1355]  Out: 2150 [Urine:1950; Blood:200]    4/10 pain  Gen: alert and oriented, answers questions appropriately bedside, some delayed answers but does follow commands and similar examination to evaluation yesterday    Head: normorcephalic, atraumatic  Resp: unlabored, room air  Pelvis: stable  LLE incision c/d/I, no drainage, no bandage saturation  Sensation to light touch intact  Gastroc TA EHL intact  Distal pulses palpable, warm well perfused  Calf supple nontender to palpation       Data  CBC:   Lab Results   Component Value Date/Time    WBC 6.4 07/01/2025 07:00 AM    HGB 9.2 07/01/2025 07:00 AM     07/01/2025 07:00 AM     BMP:    Lab Results   Component Value Date/Time     07/02/2025 06:28 AM    K 4.4 07/02/2025 06:28 AM    K 3.9 07/01/2025 07:00 AM     07/02/2025 06:28 AM    CO2 20 07/02/2025 06:28 AM    BUN 11 07/02/2025 06:28 AM    CREATININE 0.7 07/02/2025 06:28 AM    CALCIUM 7.4 07/02/2025 06:28 AM    GLUCOSE 109 07/02/2025 06:28 AM     Uric Acid:  No components found for: \"URIC\"  PT/INR:    Lab Results   Component Value Date/Time    PROTIME 12.3 07/01/2025 07:00 AM    INR 1.08 07/01/2025 07:00 AM     Troponin:  No results found for: \"TROPONINI\"  Urine Culture:  No components found for: \"CURINE\"      Current Inpatient Medications    Current Facility-Administered Medications: [Held by provider] aspirin EC tablet 81 mg, 81 mg, Oral, 
Orthopaedic Progress Note      SUBJECTIVE     Ms. Santos is post op day # 2 ORIF L femur     Seen at bedside this morning  Some confusion but redirectable  Transfusion yesterday   Pain appears well controlled, tolerating oral diet  Denies any numbness/paresthesia in operative extremity      OBJECTIVE      Physical    VITALS:  BP (!) 104/58   Pulse 92   Temp 98.4 °F (36.9 °C) (Oral)   Resp 16   Ht 1.422 m (4' 8\")   Wt 54.4 kg (120 lb)   SpO2 94%   BMI 26.90 kg/m²   I/O last 3 completed shifts:  In: 2704 [P.O.:1030; I.V.:1345; Blood:329]  Out: 1850 [Urine:1650; Blood:200]    4/10 pain  Gen: alert and oriented, answers questions appropriately bedside, reorients to events of admission, does follow commands and similar examination to evaluation yesterday    Head: normorcephalic, atraumatic  Resp: unlabored, room air  Pelvis: stable  LLE incision c/d/I, no drainage, no bandage saturation  Sensation to light touch intact  Gastroc TA EHL intact  Distal pulses palpable, warm well perfused  Calf supple nontender to palpation       Data  CBC:   Lab Results   Component Value Date/Time    WBC 7.5 07/02/2025 08:16 AM    HGB 7.8 07/02/2025 01:57 PM     07/02/2025 08:16 AM     BMP:    Lab Results   Component Value Date/Time     07/02/2025 06:28 AM    K 4.4 07/02/2025 06:28 AM    K 3.9 07/01/2025 07:00 AM     07/02/2025 06:28 AM    CO2 20 07/02/2025 06:28 AM    BUN 11 07/02/2025 06:28 AM    CREATININE 0.7 07/02/2025 06:28 AM    CALCIUM 7.4 07/02/2025 06:28 AM    GLUCOSE 109 07/02/2025 06:28 AM     Uric Acid:  No components found for: \"URIC\"  PT/INR:    Lab Results   Component Value Date/Time    PROTIME 12.3 07/01/2025 07:00 AM    INR 1.08 07/01/2025 07:00 AM     Troponin:  No results found for: \"TROPONINI\"  Urine Culture:  No components found for: \"CURINE\"      Current Inpatient Medications    Current Facility-Administered Medications: cyclobenzaprine (FLEXERIL) tablet 10 mg, 10 mg, Oral, TID PRN  0.9 % 
Orthopaedic Progress Note      SUBJECTIVE     Ms. Santos is post op day # 3 ORIF L femur     Up to chair this AM  Some confusion but redirectable, CT head stable  Good effort therapy   Pain appears well controlled, tolerating oral diet  Denies any numbness/paresthesia in operative extremity      OBJECTIVE      Physical    VITALS:  /73   Pulse (!) 124   Temp 99.3 °F (37.4 °C) (Oral)   Resp 16   Ht 1.422 m (4' 8\")   Wt 54.4 kg (120 lb)   SpO2 97%   BMI 26.90 kg/m²   I/O last 3 completed shifts:  In: 2594.2 [P.O.:1310; I.V.:1284.2]  Out: 4175 [Urine:4175]    4/10 pain  Gen: alert and oriented, answers questions appropriately bedside, reorients to events of admission, does follow commands and similar examination to evaluation yesterday    Head: normorcephalic, atraumatic  Resp: unlabored, room air  Pelvis: stable  LLE incision c/d/I, no drainage, no bandage saturation  Sensation to light touch intact  Gastroc TA EHL intact  Distal pulses palpable, warm well perfused  Calf supple nontender to palpation       Data  CBC:   Lab Results   Component Value Date/Time    WBC 8.2 07/03/2025 07:48 AM    HGB 8.0 07/03/2025 07:48 AM     07/03/2025 07:48 AM     BMP:    Lab Results   Component Value Date/Time     07/03/2025 07:48 AM    K 4.5 07/03/2025 07:48 AM    K 3.9 07/01/2025 07:00 AM     07/03/2025 07:48 AM    CO2 24 07/03/2025 07:48 AM    BUN 6 07/03/2025 07:48 AM    CREATININE 0.5 07/03/2025 07:48 AM    CALCIUM 8.1 07/03/2025 07:48 AM    GLUCOSE 109 07/03/2025 07:48 AM     Uric Acid:  No components found for: \"URIC\"  PT/INR:    Lab Results   Component Value Date/Time    PROTIME 12.3 07/01/2025 07:00 AM    INR 1.08 07/01/2025 07:00 AM     Troponin:  No results found for: \"TROPONINI\"  Urine Culture:  No components found for: \"CURINE\"      Current Inpatient Medications    Current Facility-Administered Medications: HYDROcodone-acetaminophen (NORCO) 5-325 MG per tablet 1 tablet, 1 tablet, Oral, Q4H 
Orthopaedic Progress Note      SUBJECTIVE     Ms. Santos is post op day # 4 ORIF L femur     Some confusion but redirectable,improving, CT head stable, this is most prevalent in AM  Did not sleep well  Did not mobilize yesterday   Pain appears well controlled, tolerating oral diet  Denies any numbness/paresthesia in operative extremity      OBJECTIVE      Physical    VITALS:  BP (!) 156/71   Pulse (!) 115   Temp 98.3 °F (36.8 °C) (Oral)   Resp 18   Ht 1.422 m (4' 8\")   Wt 54.4 kg (120 lb)   SpO2 96%   BMI 26.90 kg/m²   I/O last 3 completed shifts:  In: 1350 [P.O.:1350]  Out: 4575 [Urine:4575]    4/10 pain  Gen: alert and oriented, answers questions appropriately bedside, reorients to events of admission, does follow commands and similar examination to evaluation yesterday , she is easily redirectable   Head: normorcephalic, atraumatic  Resp: unlabored, room air  Pelvis: stable  LLE incision c/d/I, no drainage, no bandage saturation  Sensation to light touch intact  Gastroc TA EHL intact  Distal pulses palpable, warm well perfused  Calf supple nontender to palpation       Data  CBC:   Lab Results   Component Value Date/Time    WBC 8.0 07/04/2025 07:17 AM    HGB 8.0 07/04/2025 07:17 AM     07/04/2025 07:17 AM     BMP:    Lab Results   Component Value Date/Time     07/04/2025 07:17 AM    K 3.9 07/04/2025 07:17 AM    K 3.9 07/01/2025 07:00 AM     07/04/2025 07:17 AM    CO2 26 07/04/2025 07:17 AM    BUN 6 07/04/2025 07:17 AM    CREATININE 0.5 07/04/2025 07:17 AM    CALCIUM 8.5 07/04/2025 07:17 AM    GLUCOSE 121 07/04/2025 07:17 AM     Uric Acid:  No components found for: \"URIC\"  PT/INR:    Lab Results   Component Value Date/Time    PROTIME 12.3 07/01/2025 07:00 AM    INR 1.08 07/01/2025 07:00 AM     Troponin:  No results found for: \"TROPONINI\"  Urine Culture:  No components found for: \"CURINE\"      Current Inpatient Medications    Current Facility-Administered Medications: traZODone (DESYREL) 
Orthopaedic Progress Note      SUBJECTIVE     Ms. Santos is post op day # 5 ORIF L femur     AM confusion continues, does not resolve  To MRI  Bedside sitter  Did not mobilize yesterday   Pain appears well controlled, tolerating oral diet      OBJECTIVE      Physical    VITALS:  /76   Pulse (!) 118   Temp 98.6 °F (37 °C) (Oral)   Resp 16   Ht 1.422 m (4' 8\")   Wt 54.4 kg (120 lb)   SpO2 98%   BMI 26.90 kg/m²   I/O last 3 completed shifts:  In: 480 [P.O.:480]  Out: 3925 [Urine:3825; Stool:100]    4/10 pain  Gen: follows directions, reorients well to location and events of admission   Head: normorcephalic, atraumatic  Resp: unlabored, room air  Pelvis: stable  LLE incision c/d/I, no drainage, no bandage saturation  Sensation to light touch intact  Gastroc TA EHL intact  Distal pulses palpable, warm well perfused  Calf supple nontender to palpation       Data  CBC:   Lab Results   Component Value Date/Time    WBC 6.5 07/06/2025 07:01 AM    HGB 8.5 07/06/2025 07:01 AM     07/06/2025 07:01 AM     BMP:    Lab Results   Component Value Date/Time     07/06/2025 07:01 AM    K 3.9 07/06/2025 07:01 AM    K 3.9 07/01/2025 07:00 AM     07/06/2025 07:01 AM    CO2 22 07/06/2025 07:01 AM    BUN 13 07/06/2025 07:01 AM    CREATININE 0.5 07/06/2025 07:01 AM    CALCIUM 9.2 07/06/2025 07:01 AM    GLUCOSE 121 07/06/2025 07:01 AM     Uric Acid:  No components found for: \"URIC\"  PT/INR:    Lab Results   Component Value Date/Time    PROTIME 12.3 07/01/2025 07:00 AM    INR 1.08 07/01/2025 07:00 AM     Troponin:  No results found for: \"TROPONINI\"  Urine Culture:  No components found for: \"CURINE\"      Current Inpatient Medications    Current Facility-Administered Medications: naloxegol (MOVANTIK) tablet 12.5 mg, 12.5 mg, Oral, QAM AC  polyethylene glycol (GLYCOLAX) packet 17 g, 17 g, Oral, BID  docusate sodium (COLACE) capsule 100 mg, 100 mg, Oral, BID  senna (SENOKOT) tablet 34.4 mg, 4 tablet, Oral, 
Orthopaedic Progress Note      SUBJECTIVE     Ms. Santos is post op day #6 ORIF L femur     Remains confused but is easily redirectable, improved from yesterday  She did sleep well last night for first time since surgery  MRI yesterday without acute process   Bedside sitter  Good effort therapy   Pain appears well controlled, tolerating oral diet      OBJECTIVE      Physical    VITALS:  /61   Pulse 83   Temp 97.7 °F (36.5 °C) (Oral)   Resp 20   Ht 1.422 m (4' 8\")   Wt 54.4 kg (120 lb)   SpO2 96%   BMI 26.90 kg/m²   I/O last 3 completed shifts:  In: 1230 [P.O.:1230]  Out: 2300 [Urine:2200; Stool:100]    4/10 pain  Gen: follows directions, reorients well to location and events of admission, pleasant and cooperative   Head: normorcephalic, atraumatic  Resp: unlabored, room air  Pelvis: stable  LLE incision c/d/I, no drainage, no bandage saturation  Sensation to light touch intact  Gastroc TA EHL intact  Distal pulses palpable, warm well perfused  Calf supple nontender to palpation       Data  CBC:   Lab Results   Component Value Date/Time    WBC 6.5 07/06/2025 07:01 AM    HGB 8.5 07/06/2025 07:01 AM     07/06/2025 07:01 AM     BMP:    Lab Results   Component Value Date/Time     07/06/2025 07:01 AM    K 3.9 07/06/2025 07:01 AM    K 3.9 07/01/2025 07:00 AM     07/06/2025 07:01 AM    CO2 22 07/06/2025 07:01 AM    BUN 13 07/06/2025 07:01 AM    CREATININE 0.5 07/06/2025 07:01 AM    CALCIUM 9.2 07/06/2025 07:01 AM    GLUCOSE 121 07/06/2025 07:01 AM     Uric Acid:  No components found for: \"URIC\"  PT/INR:    Lab Results   Component Value Date/Time    PROTIME 12.3 07/01/2025 07:00 AM    INR 1.08 07/01/2025 07:00 AM     Troponin:  No results found for: \"TROPONINI\"  Urine Culture:  No components found for: \"CURINE\"      Current Inpatient Medications    Current Facility-Administered Medications: naloxegol (MOVANTIK) tablet 12.5 mg, 12.5 mg, Oral, QAM AC  polyethylene glycol (GLYCOLAX) packet 17 g, 
Patient discharged to [facility]. Patient transported by [facility]. Patient left with all belongings, blue folder, prescriptions. Patient sent home with [facility] soap for infection prevention. All questions answered at this time. Patient has follow up appointments scheduled with PCP and Dr Jimenez.    
Patient returned to 7K04 from PACU per bed. Bed alarm placed on. Post op orderds and care board reviewed  
Report called to RN at [facility]  
Spiritual Health History and Assessment/Progress Note  [facility]    Initial Encounter,  ,  ,      Name: Naina Santos MRN: [id number]    Age: 64 y.o.     Sex: female   Language: English   Worship: None   Closed displaced comminuted fracture of shaft of left femur, initial encounter ([facility])     Date: 7/8/2025            Total Time Calculated: (P) 8 min              Spiritual Assessment began in [facility] 7K        Referral/Consult From: Rounding   Encounter Overview/Reason: Initial Encounter  Service Provided For: Patient    Tara, Belief, Meaning:   Patient Other: none  Family/Friends No family/friends present      Importance and Influence:  Patient Other: none  Family/Friends No family/friends present    Community:  Patient Other: None  Family/Friends No family/friends present    Assessment and Plan of Care:     Patient Interventions include: Facilitated expression of thoughts and feelings  Family/Friends Interventions include: No family/friends present    Patient Plan of Care: Spiritual Care available upon further referral  Family/Friends Plan of Care: No family/friends present    Electronically signed by YURI Berumen on 7/8/2025 at 3:55 PM   
[facility]  PHYSICAL THERAPY MISSED TREATMENT NOTE  [facility] 7K    Date: 2025  Patient Name: Naina Santos        MRN: [id number]   : 1960  (64 y.o.)  Gender: female                REASON FOR MISSED TREATMENT:   Hold Treatment per Nursing. Pt has hgb of 6.1 this AM. PT will hold at this time.            .            
To OR per bed  
Independent household ambulator, with or without device  Has the patient had two or more falls in the past year or any fall with injury in the past year?: Yes    Active : Yes  Occupation: Unemployed  Additional Comments: RW at baseline    SUBJECTIVE: RN okayed OT session.  Pt. In room and agreeable to participate.  Pt. Family member present and with questions and requests for a pain medication, RN notified and aware.     PAIN: 10/10: LLE RN notified Pt. Requesting pain medication.      Vitals: Vitals not assessed per clinical judgement, see nursing flowsheet    COGNITION: WFL    ADL:   No ADL's completed this session..    IADL:   Not Tested    BED MOBILITY:  Not Tested    TRANSFERS:  Sit to Stand:  Contact Guard Assistance.    Stand to Sit: Contact Guard Assistance.      FUNCTIONAL MOBILITY:  Assistive Device: Rolling Walker  Assist Level:  Contact Guard Assistance.   Distance: Within room  Hopping with good adherence to NWB on LLE.     ADDITIONAL ACTIVITIES:  Therapeutic Exercise:    Addressed increasing patient's UB strength through completion of upper extremity  theraband exercises using  resistance band. Pt completed pull aparts, shoulder flexion, elbow extension, horizontal  abduction and chest press x12-15 reps each while seated in chair for increased strength and overall activity tolerance to support ADLs  .    Modified Pierce:  Current Functional Status:  Not Applicable    Education:  Learners: Patient  ADL's, Home Exercise Program, Precautions:  , Fall Prevention, and Assistive Device Safety    ASSESSMENT:     Activity Tolerance:  Patient tolerance of  treatment: Good treatment tolerance      Plan: Times Per Week: 6x  Current Treatment Recommendations: Strengthening, Balance training, Functional mobility training, Endurance training, Patient/Caregiver education & training, Safety education & training, Cognitive reorientation, Self-Care / ADL, Home management training    Goals  Short Term Goals  Time 
Responsibilities: Yes  Prior Level of Assist for Transfers: Independent  Prior Level of Assist for Ambulation: Independent household ambulator, with or without device  Has the patient had two or more falls in the past year or any fall with injury in the past year?: Yes    Active : Yes  Occupation: Unemployed  Additional Comments: RW at baseline    SUBJECTIVE: Nursing approved OT session, upon arrival pt resting in bed and agreeable to OT session.  Nursing came in towards end of OT session and states pt to have MRI for stroke work up as pt demo increased leaning, increased confusion, etc.     PAIN: Pt did not provide numerics in regards to pain, but does demonstrate facial grimaces with movement in LLE.     Vitals: Vitals not assessed per clinical judgement, see nursing flowsheet    COGNITION: Decreased Recall, Decreased Insight, Decreased Problem Solving, Decreased Safety Awareness, Impaired Attention, Difficulty Following Commands, and Impulsive    ADL:   Feeding: Set up assistance with increased time as breakfast arrived during session    Grooming: OT noted increased knots in pt's hair, OT donned no rinse shampoo with Max A and extensive assist for knot management this date.  Pt demo much gratitude.     Footwear Management: Maximum Assistance to manage gripper socks    Toileting: Pt required Mod A x1 for upright positioning when standing with an additional assist for dependent kaylan care and brief management.  Pt demo incontinence of bowels and states she was not aware that she had BM in brief.  During toileting OT provided mod verbal cues for upright positioning as R lean noted with reduced carryover to correct.     Toilet Transfer: Mod A x1 to transfer to/from [facility] with cues for hand placement and technique, reduced carryover noted with hand placement    BED MOBILITY:  Supine to Sit: Minimal Assistance, X 1, with verbal cues , with increased time for completion    Sit to Supine: Moderate Assistance, X 2, 
sodium (COLACE) capsule 100 mg, 100 mg, Oral, BID  senna (SENOKOT) tablet 34.4 mg, 4 tablet, Oral, Daily  traZODone (DESYREL) tablet 100 mg, 100 mg, Oral, Nightly PRN  [Held by provider] HYDROcodone-acetaminophen (NORCO) 5-325 MG per tablet 1 tablet, 1 tablet, Oral, Q4H PRN **OR** [Held by provider] HYDROcodone-acetaminophen (NORCO) 5-325 MG per tablet 2 tablet, 2 tablet, Oral, Q4H PRN  0.9 % sodium chloride infusion, , IntraVENous, PRN  [Held by provider] aspirin EC tablet 81 mg, 81 mg, Oral, Daily  rivaroxaban (XARELTO) tablet 10 mg, 10 mg, Oral, Dinner  sodium chloride flush 0.9 % injection 5-40 mL, 5-40 mL, IntraVENous, 2 times per day  sodium chloride flush 0.9 % injection 5-40 mL, 5-40 mL, IntraVENous, PRN  0.9 % sodium chloride infusion, , IntraVENous, PRN  ondansetron (ZOFRAN-ODT) disintegrating tablet 4 mg, 4 mg, Oral, Q8H PRN **OR** ondansetron (ZOFRAN) injection 4 mg, 4 mg, IntraVENous, Q6H PRN  fluticasone (FLONASE) 50 MCG/ACT nasal spray 1 spray, 1 spray, Nasal, Daily PRN  lactobacillus (CULTURELLE) capsule 1 capsule, 1 capsule, Oral, Daily with breakfast  metoprolol tartrate (LOPRESSOR) tablet 12.5 mg, 12.5 mg, Oral, BID  pantoprazole (PROTONIX) tablet 40 mg, 40 mg, Oral, QAM AC  atorvastatin (LIPITOR) tablet 20 mg, 20 mg, Oral, Nightly  pregabalin (LYRICA) capsule 150 mg, 150 mg, Oral, TID  Vitamin D (CHOLECALCIFEROL) tablet 1,000 Units, 1,000 Units, Oral, Daily        PLAN    Ms. Santos is post op day #7 ORIF L femur     Continue to ADAT  Post operative imaging reviewed, stable  Labs stable post op  Reinforce dressing prn saturation   NWB LLE  PT/OT  Xarelto  Bowel regimen  Delirium precautions, sleep hygiene   UA, anaya exchange  Dispo- SNF, dc today

## (undated) DEVICE — BANDAGE COMPR M W6INXL10YD WHT BGE VELC E MTRX HK AND LOOP

## (undated) DEVICE — EVOS SMALL 2.5MM DRILL W/AO QC LONG: Brand: EVOS

## (undated) DEVICE — BAG,BANDED,W/RUBBERBAND,STERILE,30X36: Brand: MEDLINE

## (undated) DEVICE — DRAPE C ARM W16XL84IN W CLP FOR OEC GE 9600 9800

## (undated) DEVICE — C-ARMOR C-ARM EQUIPMENT COVERS CLEAR STERILE UNIVERSAL FIT 12 PER CASE: Brand: C-ARMOR

## (undated) DEVICE — HOOD WITH PEEL AWAY FACE SHIELD: Brand: T7PLUS

## (undated) DEVICE — SUTURE ABSORBABLE MONOFILAMENT 1 CTX 36 CM 48 MM VIO PDS +

## (undated) DEVICE — SUTURE ABSORBABLE MONOFILAMENT 2-0 CT-1 24 CM 36 MM VIO PDS+

## (undated) DEVICE — 450 ML BOTTLE OF 0.05% CHLORHEXIDINE GLUCONATE IN 99.95% STERILE WATER FOR IRRIGATION, USP AND APPLICATOR.: Brand: IRRISEPT ANTIMICROBIAL WOUND LAVAGE

## (undated) DEVICE — Device

## (undated) DEVICE — 2.5 DRILL

## (undated) DEVICE — PACK-MAJOR

## (undated) DEVICE — SUTURE VICRYL + SZ 2-0 L27IN ABSRB UD CP-1 1/2 CIR REV CUT VCP266H

## (undated) DEVICE — ADHESIVE SKIN CLOSURE WND 8.661X1.5 IN 22 CM LIQUIBAND SECUR

## (undated) DEVICE — SUTURE ABSORBABLE BRAIDED 0 CP-1 27 IN COAT UD VICRYL + VCP267H